# Patient Record
Sex: MALE | Race: WHITE | Employment: OTHER | URBAN - METROPOLITAN AREA
[De-identification: names, ages, dates, MRNs, and addresses within clinical notes are randomized per-mention and may not be internally consistent; named-entity substitution may affect disease eponyms.]

---

## 2019-01-01 ENCOUNTER — APPOINTMENT (OUTPATIENT)
Dept: CARDIOLOGY | Facility: CLINIC | Age: 74
DRG: 177 | End: 2019-01-01
Attending: INTERNAL MEDICINE
Payer: COMMERCIAL

## 2019-01-01 ENCOUNTER — APPOINTMENT (OUTPATIENT)
Dept: ULTRASOUND IMAGING | Facility: CLINIC | Age: 74
End: 2019-01-01
Attending: FAMILY MEDICINE
Payer: COMMERCIAL

## 2019-01-01 ENCOUNTER — HOSPITAL LABORATORY (OUTPATIENT)
Facility: OTHER | Age: 74
End: 2019-01-01

## 2019-01-01 ENCOUNTER — NURSING HOME VISIT (OUTPATIENT)
Dept: GERIATRICS | Facility: CLINIC | Age: 74
End: 2019-01-01
Payer: COMMERCIAL

## 2019-01-01 ENCOUNTER — HOSPITAL ENCOUNTER (EMERGENCY)
Facility: CLINIC | Age: 74
Discharge: HOME | End: 2019-07-24
Attending: EMERGENCY MEDICINE | Admitting: EMERGENCY MEDICINE
Payer: COMMERCIAL

## 2019-01-01 ENCOUNTER — APPOINTMENT (OUTPATIENT)
Dept: GENERAL RADIOLOGY | Facility: CLINIC | Age: 74
DRG: 177 | End: 2019-01-01
Attending: STUDENT IN AN ORGANIZED HEALTH CARE EDUCATION/TRAINING PROGRAM
Payer: COMMERCIAL

## 2019-01-01 ENCOUNTER — APPOINTMENT (OUTPATIENT)
Dept: CT IMAGING | Facility: CLINIC | Age: 74
DRG: 177 | End: 2019-01-01
Attending: STUDENT IN AN ORGANIZED HEALTH CARE EDUCATION/TRAINING PROGRAM
Payer: COMMERCIAL

## 2019-01-01 ENCOUNTER — TELEPHONE (OUTPATIENT)
Dept: GERIATRICS | Facility: CLINIC | Age: 74
End: 2019-01-01

## 2019-01-01 ENCOUNTER — APPOINTMENT (OUTPATIENT)
Dept: GENERAL RADIOLOGY | Facility: CLINIC | Age: 74
DRG: 177 | End: 2019-01-01
Attending: INTERNAL MEDICINE
Payer: COMMERCIAL

## 2019-01-01 ENCOUNTER — NURSING HOME VISIT (OUTPATIENT)
Dept: GERIATRICS | Facility: CLINIC | Age: 74
End: 2019-01-01
Payer: MEDICARE

## 2019-01-01 ENCOUNTER — AMBULATORY - HEALTHEAST (OUTPATIENT)
Dept: OTHER | Facility: CLINIC | Age: 74
End: 2019-01-01

## 2019-01-01 ENCOUNTER — DOCUMENTATION ONLY (OUTPATIENT)
Dept: OTHER | Facility: CLINIC | Age: 74
End: 2019-01-01

## 2019-01-01 ENCOUNTER — HOSPITAL ENCOUNTER (EMERGENCY)
Facility: CLINIC | Age: 74
Discharge: HOME | End: 2019-07-20
Attending: EMERGENCY MEDICINE | Admitting: EMERGENCY MEDICINE
Payer: COMMERCIAL

## 2019-01-01 ENCOUNTER — HOSPITAL ENCOUNTER (EMERGENCY)
Facility: CLINIC | Age: 74
Discharge: HOME | End: 2019-07-04
Attending: FAMILY MEDICINE | Admitting: FAMILY MEDICINE
Payer: COMMERCIAL

## 2019-01-01 ENCOUNTER — APPOINTMENT (OUTPATIENT)
Dept: CT IMAGING | Facility: CLINIC | Age: 74
DRG: 177 | End: 2019-01-01
Attending: PHYSICIAN ASSISTANT
Payer: COMMERCIAL

## 2019-01-01 ENCOUNTER — APPOINTMENT (OUTPATIENT)
Dept: CT IMAGING | Facility: CLINIC | Age: 74
End: 2019-01-01
Attending: FAMILY MEDICINE
Payer: COMMERCIAL

## 2019-01-01 ENCOUNTER — HOSPITAL ENCOUNTER (INPATIENT)
Facility: CLINIC | Age: 74
LOS: 4 days | Discharge: NURSING, REHAB OR LONG-TERM CARE | DRG: 177 | End: 2019-07-19
Attending: STUDENT IN AN ORGANIZED HEALTH CARE EDUCATION/TRAINING PROGRAM | Admitting: INTERNAL MEDICINE
Payer: COMMERCIAL

## 2019-01-01 ENCOUNTER — HOSPITAL ENCOUNTER (EMERGENCY)
Facility: CLINIC | Age: 74
Discharge: HOME | End: 2019-07-11
Attending: FAMILY MEDICINE | Admitting: FAMILY MEDICINE
Payer: COMMERCIAL

## 2019-01-01 ENCOUNTER — APPOINTMENT (OUTPATIENT)
Dept: GENERAL RADIOLOGY | Facility: CLINIC | Age: 74
End: 2019-01-01
Attending: EMERGENCY MEDICINE
Payer: COMMERCIAL

## 2019-01-01 ENCOUNTER — MEDICAL CORRESPONDENCE (OUTPATIENT)
Dept: HEALTH INFORMATION MANAGEMENT | Facility: CLINIC | Age: 74
End: 2019-01-01

## 2019-01-01 VITALS
BODY MASS INDEX: 25.29 KG/M2 | SYSTOLIC BLOOD PRESSURE: 122 MMHG | WEIGHT: 186.7 LBS | OXYGEN SATURATION: 95 % | DIASTOLIC BLOOD PRESSURE: 80 MMHG | HEART RATE: 75 BPM | HEIGHT: 72 IN | RESPIRATION RATE: 18 BRPM | TEMPERATURE: 97.8 F

## 2019-01-01 VITALS
SYSTOLIC BLOOD PRESSURE: 111 MMHG | HEIGHT: 72 IN | WEIGHT: 184.4 LBS | DIASTOLIC BLOOD PRESSURE: 73 MMHG | TEMPERATURE: 99 F | RESPIRATION RATE: 18 BRPM | OXYGEN SATURATION: 93 % | BODY MASS INDEX: 24.98 KG/M2 | HEART RATE: 87 BPM

## 2019-01-01 VITALS
HEART RATE: 89 BPM | SYSTOLIC BLOOD PRESSURE: 134 MMHG | TEMPERATURE: 99.8 F | WEIGHT: 186 LBS | RESPIRATION RATE: 24 BRPM | OXYGEN SATURATION: 95 % | BODY MASS INDEX: 25.19 KG/M2 | HEIGHT: 72 IN | DIASTOLIC BLOOD PRESSURE: 77 MMHG

## 2019-01-01 VITALS
DIASTOLIC BLOOD PRESSURE: 96 MMHG | SYSTOLIC BLOOD PRESSURE: 153 MMHG | RESPIRATION RATE: 18 BRPM | HEART RATE: 82 BPM | OXYGEN SATURATION: 96 % | TEMPERATURE: 97.6 F

## 2019-01-01 VITALS
WEIGHT: 180 LBS | RESPIRATION RATE: 18 BRPM | DIASTOLIC BLOOD PRESSURE: 82 MMHG | BODY MASS INDEX: 24.41 KG/M2 | SYSTOLIC BLOOD PRESSURE: 120 MMHG | TEMPERATURE: 97.9 F | OXYGEN SATURATION: 97 %

## 2019-01-01 VITALS
BODY MASS INDEX: 24.41 KG/M2 | WEIGHT: 180 LBS | DIASTOLIC BLOOD PRESSURE: 77 MMHG | SYSTOLIC BLOOD PRESSURE: 123 MMHG | HEART RATE: 92 BPM | RESPIRATION RATE: 16 BRPM | OXYGEN SATURATION: 96 % | TEMPERATURE: 98.4 F

## 2019-01-01 VITALS
WEIGHT: 181.44 LBS | BODY MASS INDEX: 24.58 KG/M2 | SYSTOLIC BLOOD PRESSURE: 153 MMHG | DIASTOLIC BLOOD PRESSURE: 78 MMHG | RESPIRATION RATE: 18 BRPM | TEMPERATURE: 97.7 F | HEART RATE: 69 BPM | OXYGEN SATURATION: 96 % | HEIGHT: 72 IN

## 2019-01-01 VITALS
TEMPERATURE: 98.2 F | RESPIRATION RATE: 18 BRPM | OXYGEN SATURATION: 95 % | DIASTOLIC BLOOD PRESSURE: 81 MMHG | BODY MASS INDEX: 26.66 KG/M2 | HEART RATE: 97 BPM | SYSTOLIC BLOOD PRESSURE: 136 MMHG | WEIGHT: 184.5 LBS

## 2019-01-01 VITALS
HEART RATE: 82 BPM | SYSTOLIC BLOOD PRESSURE: 121 MMHG | RESPIRATION RATE: 14 BRPM | DIASTOLIC BLOOD PRESSURE: 76 MMHG | OXYGEN SATURATION: 96 % | TEMPERATURE: 97.7 F

## 2019-01-01 VITALS
WEIGHT: 185 LBS | RESPIRATION RATE: 18 BRPM | DIASTOLIC BLOOD PRESSURE: 83 MMHG | OXYGEN SATURATION: 95 % | HEIGHT: 72 IN | SYSTOLIC BLOOD PRESSURE: 133 MMHG | TEMPERATURE: 98.5 F | BODY MASS INDEX: 25.06 KG/M2 | HEART RATE: 94 BPM

## 2019-01-01 DIAGNOSIS — R53.1 WEAKNESS DUE TO ACUTE CEREBROVASCULAR ACCIDENT (CVA) (H): ICD-10-CM
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
## 2019-01-01 DIAGNOSIS — T85.698A OBSTRUCTION OF CATHETER, INITIAL ENCOUNTER: ICD-10-CM

## 2019-01-01 DIAGNOSIS — I63.9 WEAKNESS DUE TO ACUTE CEREBROVASCULAR ACCIDENT (CVA) (H): ICD-10-CM

## 2019-01-01 DIAGNOSIS — J18.9 PNEUMONIA OF RIGHT LUNG DUE TO INFECTIOUS ORGANISM, UNSPECIFIED PART OF LUNG: ICD-10-CM

## 2019-01-01 DIAGNOSIS — R79.89 LFT ELEVATION: ICD-10-CM

## 2019-01-01 DIAGNOSIS — J18.9 PNEUMONIA OF BOTH LOWER LOBES DUE TO INFECTIOUS ORGANISM: ICD-10-CM

## 2019-01-01 DIAGNOSIS — I25.10 CAD, MULTIPLE VESSEL: ICD-10-CM

## 2019-01-01 DIAGNOSIS — Z93.1 FEEDING BY G-TUBE (H): ICD-10-CM

## 2019-01-01 DIAGNOSIS — I25.5 ISCHEMIC CARDIOMYOPATHY: ICD-10-CM

## 2019-01-01 DIAGNOSIS — J96.01 ACUTE RESPIRATORY FAILURE WITH HYPOXIA (H): ICD-10-CM

## 2019-01-01 DIAGNOSIS — K52.9 COLITIS: Primary | ICD-10-CM

## 2019-01-01 DIAGNOSIS — E43 SEVERE PROTEIN-CALORIE MALNUTRITION (H): ICD-10-CM

## 2019-01-01 DIAGNOSIS — Z99.81 DEPENDENCE ON SUPPLEMENTAL OXYGEN: ICD-10-CM

## 2019-01-01 DIAGNOSIS — I69.90 HISTORY OF CEREBROVASCULAR ACCIDENT WITH RESIDUAL EFFECTS: Primary | ICD-10-CM

## 2019-01-01 DIAGNOSIS — N18.2 CKD (CHRONIC KIDNEY DISEASE) STAGE 2, GFR 60-89 ML/MIN: ICD-10-CM

## 2019-01-01 DIAGNOSIS — N47.1 PHIMOSIS: ICD-10-CM

## 2019-01-01 DIAGNOSIS — R13.12 OROPHARYNGEAL DYSPHAGIA: ICD-10-CM

## 2019-01-01 DIAGNOSIS — N13.8 HYPERTROPHY OF PROSTATE WITH URINARY OBSTRUCTION: ICD-10-CM

## 2019-01-01 DIAGNOSIS — R47.01 NONVERBAL: ICD-10-CM

## 2019-01-01 DIAGNOSIS — Z43.1 ATTENTION TO G-TUBE (H): ICD-10-CM

## 2019-01-01 DIAGNOSIS — I50.42 CHRONIC COMBINED SYSTOLIC AND DIASTOLIC CONGESTIVE HEART FAILURE (H): ICD-10-CM

## 2019-01-01 DIAGNOSIS — F32.2 MAJOR DEPRESSIVE DISORDER, SINGLE EPISODE, SEVERE (H): ICD-10-CM

## 2019-01-01 DIAGNOSIS — N39.0 URINARY TRACT INFECTION WITHOUT HEMATURIA, SITE UNSPECIFIED: ICD-10-CM

## 2019-01-01 DIAGNOSIS — K52.9 COLITIS: ICD-10-CM

## 2019-01-01 DIAGNOSIS — R79.89 ELEVATED TROPONIN: ICD-10-CM

## 2019-01-01 DIAGNOSIS — Z51.5 HOSPICE CARE PATIENT: ICD-10-CM

## 2019-01-01 DIAGNOSIS — I21.4 NSTEMI (NON-ST ELEVATED MYOCARDIAL INFARCTION) (H): Primary | ICD-10-CM

## 2019-01-01 DIAGNOSIS — R79.89 ELEVATED BRAIN NATRIURETIC PEPTIDE (BNP) LEVEL: ICD-10-CM

## 2019-01-01 DIAGNOSIS — R33.9 URINARY RETENTION: Primary | ICD-10-CM

## 2019-01-01 DIAGNOSIS — R52 PAIN: Primary | ICD-10-CM

## 2019-01-01 DIAGNOSIS — N40.1 HYPERTROPHY OF PROSTATE WITH URINARY OBSTRUCTION: ICD-10-CM

## 2019-01-01 DIAGNOSIS — R09.02 HYPOXIA: ICD-10-CM

## 2019-01-01 DIAGNOSIS — Z93.1 GASTROSTOMY TUBE DEPENDENT (H): ICD-10-CM

## 2019-01-01 DIAGNOSIS — K70.30 ALCOHOLIC CIRRHOSIS OF LIVER WITHOUT ASCITES (H): ICD-10-CM

## 2019-01-01 DIAGNOSIS — R33.9 URINARY RETENTION: ICD-10-CM

## 2019-01-01 DIAGNOSIS — R33.9 URINARY RETENTION WITH INCOMPLETE BLADDER EMPTYING: ICD-10-CM

## 2019-01-01 DIAGNOSIS — K56.0 PARALYTIC ILEUS (H): ICD-10-CM

## 2019-01-01 DIAGNOSIS — G93.40 ACUTE ENCEPHALOPATHY: ICD-10-CM

## 2019-01-01 DIAGNOSIS — E78.5 HYPERLIPIDEMIA WITH TARGET LDL LESS THAN 130: ICD-10-CM

## 2019-01-01 DIAGNOSIS — Z43.0 TRACHEOSTOMY CARE (H): ICD-10-CM

## 2019-01-01 DIAGNOSIS — K94.23 GASTROSTOMY TUBE OBSTRUCTION (H): ICD-10-CM

## 2019-01-01 DIAGNOSIS — H04.123 DRY EYES: ICD-10-CM

## 2019-01-01 DIAGNOSIS — R41.0 CONFUSION: ICD-10-CM

## 2019-01-01 DIAGNOSIS — K70.30 ALCOHOLIC CIRRHOSIS, UNSPECIFIED WHETHER ASCITES PRESENT (H): ICD-10-CM

## 2019-01-01 DIAGNOSIS — I69.90 HISTORY OF CEREBROVASCULAR ACCIDENT WITH RESIDUAL EFFECTS: ICD-10-CM

## 2019-01-01 DIAGNOSIS — R53.81 PHYSICAL DECONDITIONING: ICD-10-CM

## 2019-01-01 DIAGNOSIS — T85.598A OBSTRUCTION OF FEEDING TUBE, INITIAL ENCOUNTER: ICD-10-CM

## 2019-01-01 DIAGNOSIS — G47.00 INSOMNIA, UNSPECIFIED TYPE: ICD-10-CM

## 2019-01-01 DIAGNOSIS — F17.210 CIGARETTE SMOKER: ICD-10-CM

## 2019-01-01 LAB
ABO + RH BLD: NORMAL
ABO + RH BLD: NORMAL
ALBUMIN SERPL-MCNC: 2 G/DL (ref 3.4–5)
ALBUMIN SERPL-MCNC: 2 G/DL (ref 3.4–5)
ALBUMIN SERPL-MCNC: 2.3 G/DL (ref 3.4–5)
ALBUMIN SERPL-MCNC: 2.9 G/DL (ref 3.4–5)
ALBUMIN UR-MCNC: 100 MG/DL
ALBUMIN UR-MCNC: NEGATIVE MG/DL
ALBUMIN UR-MCNC: NEGATIVE MG/DL
ALP SERPL-CCNC: 236 U/L (ref 40–150)
ALP SERPL-CCNC: 241 U/L (ref 40–150)
ALP SERPL-CCNC: 326 U/L (ref 40–150)
ALP SERPL-CCNC: 417 U/L (ref 40–150)
ALT SERPL W P-5'-P-CCNC: 47 U/L (ref 0–70)
ALT SERPL W P-5'-P-CCNC: 58 U/L (ref 0–70)
ALT SERPL W P-5'-P-CCNC: 79 U/L (ref 0–70)
ALT SERPL W P-5'-P-CCNC: 95 U/L (ref 0–70)
AMMONIA PLAS-SCNC: 28 UMOL/L (ref 10–50)
AMORPH CRY #/AREA URNS HPF: ABNORMAL /HPF
ANION GAP SERPL CALCULATED.3IONS-SCNC: 6 MMOL/L (ref 3–14)
ANION GAP SERPL CALCULATED.3IONS-SCNC: 6 MMOL/L (ref 3–14)
ANION GAP SERPL CALCULATED.3IONS-SCNC: 7 MMOL/L (ref 3–14)
ANION GAP SERPL CALCULATED.3IONS-SCNC: 8 MMOL/L (ref 3–14)
ANION GAP SERPL CALCULATED.3IONS-SCNC: 8 MMOL/L (ref 3–14)
ANION GAP SERPL CALCULATED.3IONS-SCNC: 9 MMOL/L (ref 3–14)
ANION GAP SERPL CALCULATED.3IONS-SCNC: 9 MMOL/L (ref 3–14)
APPEARANCE UR: ABNORMAL
APPEARANCE UR: ABNORMAL
APPEARANCE UR: CLEAR
APTT PPP: 64 SEC (ref 22–37)
AST SERPL W P-5'-P-CCNC: 42 U/L (ref 0–45)
AST SERPL W P-5'-P-CCNC: 44 U/L (ref 0–45)
AST SERPL W P-5'-P-CCNC: 57 U/L (ref 0–45)
AST SERPL W P-5'-P-CCNC: 68 U/L (ref 0–45)
BACTERIA #/AREA URNS HPF: ABNORMAL /HPF
BACTERIA #/AREA URNS HPF: ABNORMAL /HPF
BACTERIA SPEC CULT: ABNORMAL
BACTERIA SPEC CULT: NO GROWTH
BASE EXCESS BLDV CALC-SCNC: 5 MMOL/L
BASOPHILS # BLD AUTO: 0.1 10E9/L (ref 0–0.2)
BASOPHILS NFR BLD AUTO: 0.3 %
BASOPHILS NFR BLD AUTO: 0.4 %
BASOPHILS NFR BLD AUTO: 0.5 %
BASOPHILS NFR BLD AUTO: 0.5 %
BILIRUB DIRECT SERPL-MCNC: 0.3 MG/DL (ref 0–0.2)
BILIRUB SERPL-MCNC: 0.5 MG/DL (ref 0.2–1.3)
BILIRUB SERPL-MCNC: 0.5 MG/DL (ref 0.2–1.3)
BILIRUB SERPL-MCNC: 0.6 MG/DL (ref 0.2–1.3)
BILIRUB SERPL-MCNC: 1.1 MG/DL (ref 0.2–1.3)
BILIRUB UR QL STRIP: NEGATIVE
BLD GP AB SCN SERPL QL: NORMAL
BLD PROD TYP BPU: NORMAL
BLD UNIT ID BPU: 0
BLD UNIT ID BPU: 0
BLOOD BANK CMNT PATIENT-IMP: NORMAL
BLOOD PRODUCT CODE: NORMAL
BLOOD PRODUCT CODE: NORMAL
BPU ID: NORMAL
BPU ID: NORMAL
BUN SERPL-MCNC: 22 MG/DL (ref 7–30)
BUN SERPL-MCNC: 26 MG/DL (ref 7–30)
BUN SERPL-MCNC: 29 MG/DL (ref 7–30)
BUN SERPL-MCNC: 31 MG/DL (ref 7–30)
BUN SERPL-MCNC: 32 MG/DL (ref 7–30)
BUN SERPL-MCNC: 35 MG/DL (ref 7–30)
BUN SERPL-MCNC: 36 MG/DL (ref 7–30)
BUN SERPL-MCNC: 39 MG/DL (ref 7–30)
BUN SERPL-MCNC: 43 MG/DL (ref 7–30)
BUN SERPL-MCNC: 46 MG/DL (ref 7–30)
C COLI+JEJUNI+LARI FUSA STL QL NAA+PROBE: NOT DETECTED
C DIFF TOX B STL QL: NEGATIVE
C DIFF TOX B STL QL: NEGATIVE
CALCIUM SERPL-MCNC: 7.9 MG/DL (ref 8.5–10.1)
CALCIUM SERPL-MCNC: 8.4 MG/DL (ref 8.5–10.1)
CALCIUM SERPL-MCNC: 8.6 MG/DL (ref 8.5–10.1)
CALCIUM SERPL-MCNC: 8.6 MG/DL (ref 8.5–10.1)
CALCIUM SERPL-MCNC: 8.7 MG/DL (ref 8.5–10.1)
CALCIUM SERPL-MCNC: 9 MG/DL (ref 8.5–10.1)
CALCIUM SERPL-MCNC: 9.1 MG/DL (ref 8.5–10.1)
CALCIUM SERPL-MCNC: 9.2 MG/DL (ref 8.5–10.1)
CALCIUM SERPL-MCNC: 9.3 MG/DL (ref 8.5–10.1)
CALCIUM SERPL-MCNC: 9.4 MG/DL (ref 8.5–10.1)
CHLORIDE SERPL-SCNC: 102 MMOL/L (ref 94–109)
CHLORIDE SERPL-SCNC: 103 MMOL/L (ref 94–109)
CHLORIDE SERPL-SCNC: 103 MMOL/L (ref 94–109)
CHLORIDE SERPL-SCNC: 104 MMOL/L (ref 94–109)
CHLORIDE SERPL-SCNC: 104 MMOL/L (ref 94–109)
CHLORIDE SERPL-SCNC: 109 MMOL/L (ref 94–109)
CHLORIDE SERPL-SCNC: 109 MMOL/L (ref 94–109)
CHLORIDE SERPL-SCNC: 114 MMOL/L (ref 94–109)
CHLORIDE SERPL-SCNC: 115 MMOL/L (ref 94–109)
CHLORIDE SERPL-SCNC: 116 MMOL/L (ref 94–109)
CO2 SERPL-SCNC: 21 MMOL/L (ref 20–32)
CO2 SERPL-SCNC: 21 MMOL/L (ref 20–32)
CO2 SERPL-SCNC: 23 MMOL/L (ref 20–32)
CO2 SERPL-SCNC: 23 MMOL/L (ref 20–32)
CO2 SERPL-SCNC: 26 MMOL/L (ref 20–32)
CO2 SERPL-SCNC: 27 MMOL/L (ref 20–32)
CO2 SERPL-SCNC: 28 MMOL/L (ref 20–32)
CO2 SERPL-SCNC: 29 MMOL/L (ref 20–32)
COLLECT DATE STL: ABNORMAL
COLOR UR AUTO: YELLOW
COPATH REPORT: NORMAL
CREAT SERPL-MCNC: 0.97 MG/DL (ref 0.66–1.25)
CREAT SERPL-MCNC: 0.99 MG/DL (ref 0.66–1.25)
CREAT SERPL-MCNC: 1.04 MG/DL (ref 0.66–1.25)
CREAT SERPL-MCNC: 1.15 MG/DL (ref 0.66–1.25)
CREAT SERPL-MCNC: 1.16 MG/DL (ref 0.66–1.25)
CREAT SERPL-MCNC: 1.17 MG/DL (ref 0.66–1.25)
CREAT SERPL-MCNC: 1.21 MG/DL (ref 0.66–1.25)
CREAT SERPL-MCNC: 1.24 MG/DL (ref 0.66–1.25)
CREAT SERPL-MCNC: 1.24 MG/DL (ref 0.66–1.25)
CREAT SERPL-MCNC: 1.32 MG/DL (ref 0.66–1.25)
DIFFERENTIAL METHOD BLD: ABNORMAL
EC STX1 GENE STL QL NAA+PROBE: NOT DETECTED
EC STX2 GENE STL QL NAA+PROBE: NOT DETECTED
ENTERIC PATHOGEN COMMENT: NORMAL
EOSINOPHIL # BLD AUTO: 0.2 10E9/L (ref 0–0.7)
EOSINOPHIL # BLD AUTO: 0.6 10E9/L (ref 0–0.7)
EOSINOPHIL # BLD AUTO: 0.6 10E9/L (ref 0–0.7)
EOSINOPHIL # BLD AUTO: 0.7 10E9/L (ref 0–0.7)
EOSINOPHIL NFR BLD AUTO: 1.2 %
EOSINOPHIL NFR BLD AUTO: 4.6 %
EOSINOPHIL NFR BLD AUTO: 5.4 %
EOSINOPHIL NFR BLD AUTO: 5.6 %
ERYTHROCYTE [DISTWIDTH] IN BLOOD BY AUTOMATED COUNT: 16.4 % (ref 10–15)
ERYTHROCYTE [DISTWIDTH] IN BLOOD BY AUTOMATED COUNT: 17 % (ref 10–15)
ERYTHROCYTE [DISTWIDTH] IN BLOOD BY AUTOMATED COUNT: 17 % (ref 10–15)
ERYTHROCYTE [DISTWIDTH] IN BLOOD BY AUTOMATED COUNT: 17.1 % (ref 10–15)
ERYTHROCYTE [DISTWIDTH] IN BLOOD BY AUTOMATED COUNT: 17.9 % (ref 10–15)
FOLATE SERPL-MCNC: 32.9 NG/ML
GFR SERPL CREATININE-BSD FRML MDRD: 52 ML/MIN/{1.73_M2}
GFR SERPL CREATININE-BSD FRML MDRD: 56 ML/MIN/{1.73_M2}
GFR SERPL CREATININE-BSD FRML MDRD: 56 ML/MIN/{1.73_M2}
GFR SERPL CREATININE-BSD FRML MDRD: 58 ML/MIN/{1.73_M2}
GFR SERPL CREATININE-BSD FRML MDRD: 60 ML/MIN/{1.73_M2}
GFR SERPL CREATININE-BSD FRML MDRD: 61 ML/MIN/{1.73_M2}
GFR SERPL CREATININE-BSD FRML MDRD: 62 ML/MIN/{1.73_M2}
GFR SERPL CREATININE-BSD FRML MDRD: 70 ML/MIN/{1.73_M2}
GFR SERPL CREATININE-BSD FRML MDRD: 74 ML/MIN/{1.73_M2}
GFR SERPL CREATININE-BSD FRML MDRD: 76 ML/MIN/{1.73_M2}
GLUCOSE SERPL-MCNC: 112 MG/DL (ref 70–99)
GLUCOSE SERPL-MCNC: 124 MG/DL (ref 70–99)
GLUCOSE SERPL-MCNC: 136 MG/DL (ref 70–99)
GLUCOSE SERPL-MCNC: 138 MG/DL (ref 70–99)
GLUCOSE SERPL-MCNC: 141 MG/DL (ref 70–99)
GLUCOSE SERPL-MCNC: 142 MG/DL (ref 70–99)
GLUCOSE SERPL-MCNC: 78 MG/DL (ref 70–99)
GLUCOSE SERPL-MCNC: 82 MG/DL (ref 70–99)
GLUCOSE SERPL-MCNC: 88 MG/DL (ref 70–99)
GLUCOSE SERPL-MCNC: 90 MG/DL (ref 70–99)
GLUCOSE UR STRIP-MCNC: NEGATIVE MG/DL
HAV IGM SERPL QL IA: NONREACTIVE
HBV SURFACE AG SERPL QL IA: NONREACTIVE
HCO3 BLDV-SCNC: 29 MMOL/L (ref 21–28)
HCT VFR BLD AUTO: 29.5 % (ref 40–53)
HCT VFR BLD AUTO: 31.2 % (ref 40–53)
HCT VFR BLD AUTO: 32.2 % (ref 40–53)
HCT VFR BLD AUTO: 36.5 % (ref 40–53)
HCT VFR BLD AUTO: 38.8 % (ref 40–53)
HCV AB SERPL QL IA: NONREACTIVE
HEMOCCULT SP1 STL QL: POSITIVE
HGB BLD-MCNC: 11.2 G/DL (ref 13.3–17.7)
HGB BLD-MCNC: 11.4 G/DL (ref 13.3–17.7)
HGB BLD-MCNC: 11.5 G/DL (ref 13.3–17.7)
HGB BLD-MCNC: 11.8 G/DL (ref 13.3–17.7)
HGB BLD-MCNC: 8.7 G/DL (ref 13.3–17.7)
HGB BLD-MCNC: 9.3 G/DL (ref 13.3–17.7)
HGB BLD-MCNC: 9.9 G/DL (ref 13.3–17.7)
HGB UR QL STRIP: NEGATIVE
HYALINE CASTS #/AREA URNS LPF: 3 /LPF (ref 0–2)
IMM GRANULOCYTES # BLD: 0.1 10E9/L (ref 0–0.4)
IMM GRANULOCYTES # BLD: 0.2 10E9/L (ref 0–0.4)
IMM GRANULOCYTES NFR BLD: 0.6 %
IMM GRANULOCYTES NFR BLD: 0.8 %
IMM GRANULOCYTES NFR BLD: 0.8 %
IMM GRANULOCYTES NFR BLD: 0.9 %
INR PPP: 1.44 (ref 0.86–1.14)
IRON SATN MFR SERPL: 8 % (ref 15–46)
IRON SERPL-MCNC: 16 UG/DL (ref 35–180)
KETONES UR STRIP-MCNC: NEGATIVE MG/DL
LACTATE BLD-SCNC: 1.1 MMOL/L (ref 0.7–2)
LACTATE BLD-SCNC: 1.4 MMOL/L (ref 0.7–2)
LACTATE BLD-SCNC: 1.6 MMOL/L (ref 0.7–2)
LACTATE BLD-SCNC: 2.2 MMOL/L (ref 0.7–2)
LEUKOCYTE ESTERASE UR QL STRIP: ABNORMAL
LIPASE SERPL-CCNC: 156 U/L (ref 73–393)
LIPASE SERPL-CCNC: 66 U/L (ref 73–393)
LYMPHOCYTES # BLD AUTO: 1.1 10E9/L (ref 0.8–5.3)
LYMPHOCYTES # BLD AUTO: 1.3 10E9/L (ref 0.8–5.3)
LYMPHOCYTES # BLD AUTO: 1.3 10E9/L (ref 0.8–5.3)
LYMPHOCYTES # BLD AUTO: 1.7 10E9/L (ref 0.8–5.3)
LYMPHOCYTES NFR BLD AUTO: 10.7 %
LYMPHOCYTES NFR BLD AUTO: 11.4 %
LYMPHOCYTES NFR BLD AUTO: 8.3 %
LYMPHOCYTES NFR BLD AUTO: 8.4 %
Lab: ABNORMAL
Lab: NORMAL
MAGNESIUM SERPL-MCNC: 2.2 MG/DL (ref 1.6–2.3)
MAGNESIUM SERPL-MCNC: 2.3 MG/DL (ref 1.6–2.3)
MAGNESIUM SERPL-MCNC: 2.4 MG/DL (ref 1.6–2.3)
MCH RBC QN AUTO: 26.1 PG (ref 26.5–33)
MCH RBC QN AUTO: 26.9 PG (ref 26.5–33)
MCH RBC QN AUTO: 26.9 PG (ref 26.5–33)
MCH RBC QN AUTO: 27.1 PG (ref 26.5–33)
MCH RBC QN AUTO: 27.2 PG (ref 26.5–33)
MCHC RBC AUTO-ENTMCNC: 29.5 G/DL (ref 31.5–36.5)
MCHC RBC AUTO-ENTMCNC: 29.8 G/DL (ref 31.5–36.5)
MCHC RBC AUTO-ENTMCNC: 30.4 G/DL (ref 31.5–36.5)
MCHC RBC AUTO-ENTMCNC: 30.7 G/DL (ref 31.5–36.5)
MCHC RBC AUTO-ENTMCNC: 30.7 G/DL (ref 31.5–36.5)
MCV RBC AUTO: 88 FL (ref 78–100)
MCV RBC AUTO: 89 FL (ref 78–100)
MCV RBC AUTO: 90 FL (ref 78–100)
MONOCYTES # BLD AUTO: 0.9 10E9/L (ref 0–1.3)
MONOCYTES # BLD AUTO: 1.1 10E9/L (ref 0–1.3)
MONOCYTES # BLD AUTO: 1.6 10E9/L (ref 0–1.3)
MONOCYTES # BLD AUTO: 2.2 10E9/L (ref 0–1.3)
MONOCYTES NFR BLD AUTO: 10.1 %
MONOCYTES NFR BLD AUTO: 10.7 %
MONOCYTES NFR BLD AUTO: 11.2 %
MONOCYTES NFR BLD AUTO: 8.2 %
MUCOUS THREADS #/AREA URNS LPF: PRESENT /LPF
MUCOUS THREADS #/AREA URNS LPF: PRESENT /LPF
NEUTROPHILS # BLD AUTO: 12.2 10E9/L (ref 1.6–8.3)
NEUTROPHILS # BLD AUTO: 15.6 10E9/L (ref 1.6–8.3)
NEUTROPHILS # BLD AUTO: 7.1 10E9/L (ref 1.6–8.3)
NEUTROPHILS # BLD AUTO: 8.1 10E9/L (ref 1.6–8.3)
NEUTROPHILS NFR BLD AUTO: 71.7 %
NEUTROPHILS NFR BLD AUTO: 73.6 %
NEUTROPHILS NFR BLD AUTO: 76 %
NEUTROPHILS NFR BLD AUTO: 78.1 %
NITRATE UR QL: NEGATIVE
NOROV GI+II ORF1-ORF2 JNC STL QL NAA+PR: NOT DETECTED
NRBC # BLD AUTO: 0 10*3/UL
NRBC BLD AUTO-RTO: 0 /100
NT-PROBNP SERPL-MCNC: 2860 PG/ML (ref 0–1800)
NUM BPU REQUESTED: 2
O2/TOTAL GAS SETTING VFR VENT: ABNORMAL %
PCO2 BLDV: 41 MM HG (ref 40–50)
PH BLDV: 7.47 PH (ref 7.32–7.43)
PH UR STRIP: 5 PH (ref 5–7)
PH UR STRIP: 7 PH (ref 5–7)
PH UR STRIP: 9 PH (ref 5–7)
PLATELET # BLD AUTO: 237 10E9/L (ref 150–450)
PLATELET # BLD AUTO: 238 10E9/L (ref 150–450)
PLATELET # BLD AUTO: 261 10E9/L (ref 150–450)
PLATELET # BLD AUTO: 265 10E9/L (ref 150–450)
PLATELET # BLD AUTO: 277 10E9/L (ref 150–450)
PO2 BLDV: 36 MM HG (ref 25–47)
POTASSIUM SERPL-SCNC: 3.2 MMOL/L (ref 3.4–5.3)
POTASSIUM SERPL-SCNC: 3.3 MMOL/L (ref 3.4–5.3)
POTASSIUM SERPL-SCNC: 3.5 MMOL/L (ref 3.4–5.3)
POTASSIUM SERPL-SCNC: 3.6 MMOL/L (ref 3.4–5.3)
POTASSIUM SERPL-SCNC: 3.7 MMOL/L (ref 3.4–5.3)
POTASSIUM SERPL-SCNC: 3.7 MMOL/L (ref 3.4–5.3)
POTASSIUM SERPL-SCNC: 3.8 MMOL/L (ref 3.4–5.3)
POTASSIUM SERPL-SCNC: 3.8 MMOL/L (ref 3.4–5.3)
POTASSIUM SERPL-SCNC: 4 MMOL/L (ref 3.4–5.3)
POTASSIUM SERPL-SCNC: 4.5 MMOL/L (ref 3.4–5.3)
PROCALCITONIN SERPL-MCNC: 0.64 NG/ML
PROT SERPL-MCNC: 6.2 G/DL (ref 6.8–8.8)
PROT SERPL-MCNC: 6.3 G/DL (ref 6.8–8.8)
PROT SERPL-MCNC: 7.2 G/DL (ref 6.8–8.8)
PROT SERPL-MCNC: 7.7 G/DL (ref 6.8–8.8)
RBC # BLD AUTO: 3.33 10E12/L (ref 4.4–5.9)
RBC # BLD AUTO: 3.46 10E12/L (ref 4.4–5.9)
RBC # BLD AUTO: 3.64 10E12/L (ref 4.4–5.9)
RBC # BLD AUTO: 4.13 10E12/L (ref 4.4–5.9)
RBC # BLD AUTO: 4.38 10E12/L (ref 4.4–5.9)
RBC #/AREA URNS AUTO: 1 /HPF (ref 0–2)
RBC #/AREA URNS AUTO: 19 /HPF (ref 0–2)
RBC #/AREA URNS AUTO: 6 /HPF (ref 0–2)
RETICS # AUTO: 91.7 10E9/L (ref 25–95)
RETICS/RBC NFR AUTO: 2.7 % (ref 0.5–2)
RVA NSP5 STL QL NAA+PROBE: NOT DETECTED
S PNEUM AG SPEC QL: NORMAL
SALMONELLA SP RPOD STL QL NAA+PROBE: NOT DETECTED
SHIGELLA SP+EIEC IPAH STL QL NAA+PROBE: NOT DETECTED
SODIUM SERPL-SCNC: 136 MMOL/L (ref 133–144)
SODIUM SERPL-SCNC: 137 MMOL/L (ref 133–144)
SODIUM SERPL-SCNC: 138 MMOL/L (ref 133–144)
SODIUM SERPL-SCNC: 138 MMOL/L (ref 133–144)
SODIUM SERPL-SCNC: 139 MMOL/L (ref 133–144)
SODIUM SERPL-SCNC: 139 MMOL/L (ref 133–144)
SODIUM SERPL-SCNC: 143 MMOL/L (ref 133–144)
SODIUM SERPL-SCNC: 144 MMOL/L (ref 133–144)
SODIUM SERPL-SCNC: 145 MMOL/L (ref 133–144)
SODIUM SERPL-SCNC: 146 MMOL/L (ref 133–144)
SOURCE: ABNORMAL
SP GR UR STRIP: 1.01 (ref 1–1.03)
SP GR UR STRIP: 1.01 (ref 1–1.03)
SP GR UR STRIP: 1.02 (ref 1–1.03)
SPECIMEN EXP DATE BLD: NORMAL
SPECIMEN SOURCE: ABNORMAL
SPECIMEN SOURCE: NORMAL
TIBC SERPL-MCNC: 212 UG/DL (ref 240–430)
TRANSFUSION STATUS PATIENT QL: NORMAL
TROPONIN I SERPL-MCNC: 0.05 UG/L (ref 0–0.04)
TROPONIN I SERPL-MCNC: 0.06 UG/L (ref 0–0.04)
TROPONIN I SERPL-MCNC: 0.07 UG/L (ref 0–0.04)
TSH SERPL DL<=0.005 MIU/L-ACNC: 0.89 MU/L (ref 0.4–4)
UROBILINOGEN UR STRIP-MCNC: 0 MG/DL (ref 0–2)
V CHOL+PARA RFBL+TRKH+TNAA STL QL NAA+PR: NOT DETECTED
VIT B12 SERPL-MCNC: 746 PG/ML (ref 193–986)
WBC # BLD AUTO: 10.9 10E9/L (ref 4–11)
WBC # BLD AUTO: 11.3 10E9/L (ref 4–11)
WBC # BLD AUTO: 16.1 10E9/L (ref 4–11)
WBC # BLD AUTO: 19.9 10E9/L (ref 4–11)
WBC # BLD AUTO: 9.9 10E9/L (ref 4–11)
WBC #/AREA URNS AUTO: 19 /HPF (ref 0–5)
WBC #/AREA URNS AUTO: 3 /HPF (ref 0–5)
WBC #/AREA URNS AUTO: 8 /HPF (ref 0–5)
Y ENTERO RECN STL QL NAA+PROBE: NOT DETECTED

## 2019-01-01 PROCEDURE — 25000125 ZZHC RX 250: Performed by: INTERNAL MEDICINE

## 2019-01-01 PROCEDURE — 25800030 ZZH RX IP 258 OP 636: Performed by: FAMILY MEDICINE

## 2019-01-01 PROCEDURE — 83605 ASSAY OF LACTIC ACID: CPT | Performed by: FAMILY MEDICINE

## 2019-01-01 PROCEDURE — 85025 COMPLETE CBC W/AUTO DIFF WBC: CPT | Performed by: FAMILY MEDICINE

## 2019-01-01 PROCEDURE — 25500064 ZZH RX 255 OP 636: Performed by: INTERNAL MEDICINE

## 2019-01-01 PROCEDURE — 25000128 H RX IP 250 OP 636: Performed by: PHYSICIAN ASSISTANT

## 2019-01-01 PROCEDURE — 99284 EMERGENCY DEPT VISIT MOD MDM: CPT | Mod: Z6 | Performed by: FAMILY MEDICINE

## 2019-01-01 PROCEDURE — 99284 EMERGENCY DEPT VISIT MOD MDM: CPT | Mod: 25 | Performed by: FAMILY MEDICINE

## 2019-01-01 PROCEDURE — 25800030 ZZH RX IP 258 OP 636: Performed by: INTERNAL MEDICINE

## 2019-01-01 PROCEDURE — 25000132 ZZH RX MED GY IP 250 OP 250 PS 637: Performed by: PHYSICIAN ASSISTANT

## 2019-01-01 PROCEDURE — 40000264 ECHOCARDIOGRAM COMPLETE

## 2019-01-01 PROCEDURE — 83735 ASSAY OF MAGNESIUM: CPT | Performed by: PHYSICIAN ASSISTANT

## 2019-01-01 PROCEDURE — 70450 CT HEAD/BRAIN W/O DYE: CPT

## 2019-01-01 PROCEDURE — 80053 COMPREHEN METABOLIC PANEL: CPT | Performed by: FAMILY MEDICINE

## 2019-01-01 PROCEDURE — 71046 X-RAY EXAM CHEST 2 VIEWS: CPT

## 2019-01-01 PROCEDURE — 25000132 ZZH RX MED GY IP 250 OP 250 PS 637: Performed by: INTERNAL MEDICINE

## 2019-01-01 PROCEDURE — 36415 COLL VENOUS BLD VENIPUNCTURE: CPT | Performed by: INTERNAL MEDICINE

## 2019-01-01 PROCEDURE — 80048 BASIC METABOLIC PNL TOTAL CA: CPT | Performed by: PHYSICIAN ASSISTANT

## 2019-01-01 PROCEDURE — 80053 COMPREHEN METABOLIC PANEL: CPT | Performed by: INTERNAL MEDICINE

## 2019-01-01 PROCEDURE — 99309 SBSQ NF CARE MODERATE MDM 30: CPT | Performed by: NURSE PRACTITIONER

## 2019-01-01 PROCEDURE — 76705 ECHO EXAM OF ABDOMEN: CPT

## 2019-01-01 PROCEDURE — 25000128 H RX IP 250 OP 636: Performed by: INTERNAL MEDICINE

## 2019-01-01 PROCEDURE — 25000125 ZZHC RX 250: Performed by: FAMILY MEDICINE

## 2019-01-01 PROCEDURE — 82140 ASSAY OF AMMONIA: CPT | Performed by: STUDENT IN AN ORGANIZED HEALTH CARE EDUCATION/TRAINING PROGRAM

## 2019-01-01 PROCEDURE — 74019 RADEX ABDOMEN 2 VIEWS: CPT

## 2019-01-01 PROCEDURE — 85018 HEMOGLOBIN: CPT | Performed by: INTERNAL MEDICINE

## 2019-01-01 PROCEDURE — 96365 THER/PROPH/DIAG IV INF INIT: CPT | Mod: 59 | Performed by: STUDENT IN AN ORGANIZED HEALTH CARE EDUCATION/TRAINING PROGRAM

## 2019-01-01 PROCEDURE — 84443 ASSAY THYROID STIM HORMONE: CPT | Performed by: INTERNAL MEDICINE

## 2019-01-01 PROCEDURE — 25000128 H RX IP 250 OP 636: Performed by: STUDENT IN AN ORGANIZED HEALTH CARE EDUCATION/TRAINING PROGRAM

## 2019-01-01 PROCEDURE — 81001 URINALYSIS AUTO W/SCOPE: CPT | Performed by: STUDENT IN AN ORGANIZED HEALTH CARE EDUCATION/TRAINING PROGRAM

## 2019-01-01 PROCEDURE — 74177 CT ABD & PELVIS W/CONTRAST: CPT

## 2019-01-01 PROCEDURE — 81001 URINALYSIS AUTO W/SCOPE: CPT | Performed by: FAMILY MEDICINE

## 2019-01-01 PROCEDURE — 87899 AGENT NOS ASSAY W/OPTIC: CPT | Performed by: PHYSICIAN ASSISTANT

## 2019-01-01 PROCEDURE — 99207 ZZC APP CREDIT; MD BILLING SHARED VISIT: CPT | Performed by: PHYSICIAN ASSISTANT

## 2019-01-01 PROCEDURE — 85025 COMPLETE CBC W/AUTO DIFF WBC: CPT | Performed by: STUDENT IN AN ORGANIZED HEALTH CARE EDUCATION/TRAINING PROGRAM

## 2019-01-01 PROCEDURE — 80076 HEPATIC FUNCTION PANEL: CPT | Performed by: INTERNAL MEDICINE

## 2019-01-01 PROCEDURE — 12000000 ZZH R&B MED SURG/OB

## 2019-01-01 PROCEDURE — 99284 EMERGENCY DEPT VISIT MOD MDM: CPT | Mod: Z6 | Performed by: EMERGENCY MEDICINE

## 2019-01-01 PROCEDURE — 36415 COLL VENOUS BLD VENIPUNCTURE: CPT | Performed by: FAMILY MEDICINE

## 2019-01-01 PROCEDURE — 84132 ASSAY OF SERUM POTASSIUM: CPT | Performed by: INTERNAL MEDICINE

## 2019-01-01 PROCEDURE — 80048 BASIC METABOLIC PNL TOTAL CA: CPT | Performed by: INTERNAL MEDICINE

## 2019-01-01 PROCEDURE — 99282 EMERGENCY DEPT VISIT SF MDM: CPT

## 2019-01-01 PROCEDURE — 85610 PROTHROMBIN TIME: CPT | Performed by: STUDENT IN AN ORGANIZED HEALTH CARE EDUCATION/TRAINING PROGRAM

## 2019-01-01 PROCEDURE — 82746 ASSAY OF FOLIC ACID SERUM: CPT | Performed by: INTERNAL MEDICINE

## 2019-01-01 PROCEDURE — 99233 SBSQ HOSP IP/OBS HIGH 50: CPT | Performed by: INTERNAL MEDICINE

## 2019-01-01 PROCEDURE — 25800030 ZZH RX IP 258 OP 636: Performed by: PHYSICIAN ASSISTANT

## 2019-01-01 PROCEDURE — 84484 ASSAY OF TROPONIN QUANT: CPT | Performed by: INTERNAL MEDICINE

## 2019-01-01 PROCEDURE — 86901 BLOOD TYPING SEROLOGIC RH(D): CPT | Performed by: INTERNAL MEDICINE

## 2019-01-01 PROCEDURE — 99283 EMERGENCY DEPT VISIT LOW MDM: CPT | Performed by: EMERGENCY MEDICINE

## 2019-01-01 PROCEDURE — 86709 HEPATITIS A IGM ANTIBODY: CPT | Performed by: INTERNAL MEDICINE

## 2019-01-01 PROCEDURE — 25000125 ZZHC RX 250: Performed by: STUDENT IN AN ORGANIZED HEALTH CARE EDUCATION/TRAINING PROGRAM

## 2019-01-01 PROCEDURE — 40000847 ZZHCL STATISTIC MORPHOLOGY W/INTERP HISTOLOGY TC 85060: Performed by: INTERNAL MEDICINE

## 2019-01-01 PROCEDURE — 25000132 ZZH RX MED GY IP 250 OP 250 PS 637: Performed by: STUDENT IN AN ORGANIZED HEALTH CARE EDUCATION/TRAINING PROGRAM

## 2019-01-01 PROCEDURE — 40000275 ZZH STATISTIC RCP TIME EA 10 MIN

## 2019-01-01 PROCEDURE — 86850 RBC ANTIBODY SCREEN: CPT | Performed by: INTERNAL MEDICINE

## 2019-01-01 PROCEDURE — 99310 SBSQ NF CARE HIGH MDM 45: CPT | Performed by: NURSE PRACTITIONER

## 2019-01-01 PROCEDURE — C9113 INJ PANTOPRAZOLE SODIUM, VIA: HCPCS | Performed by: INTERNAL MEDICINE

## 2019-01-01 PROCEDURE — 96367 TX/PROPH/DG ADDL SEQ IV INF: CPT | Performed by: STUDENT IN AN ORGANIZED HEALTH CARE EDUCATION/TRAINING PROGRAM

## 2019-01-01 PROCEDURE — 87493 C DIFF AMPLIFIED PROBE: CPT | Performed by: PHYSICIAN ASSISTANT

## 2019-01-01 PROCEDURE — 36416 COLLJ CAPILLARY BLOOD SPEC: CPT | Performed by: INTERNAL MEDICINE

## 2019-01-01 PROCEDURE — 87086 URINE CULTURE/COLONY COUNT: CPT | Performed by: FAMILY MEDICINE

## 2019-01-01 PROCEDURE — 84145 PROCALCITONIN (PCT): CPT | Performed by: STUDENT IN AN ORGANIZED HEALTH CARE EDUCATION/TRAINING PROGRAM

## 2019-01-01 PROCEDURE — 82270 OCCULT BLOOD FECES: CPT | Performed by: INTERNAL MEDICINE

## 2019-01-01 PROCEDURE — 87086 URINE CULTURE/COLONY COUNT: CPT | Performed by: STUDENT IN AN ORGANIZED HEALTH CARE EDUCATION/TRAINING PROGRAM

## 2019-01-01 PROCEDURE — 99223 1ST HOSP IP/OBS HIGH 75: CPT | Mod: AI | Performed by: INTERNAL MEDICINE

## 2019-01-01 PROCEDURE — 99285 EMERGENCY DEPT VISIT HI MDM: CPT | Mod: 25

## 2019-01-01 PROCEDURE — 99239 HOSP IP/OBS DSCHRG MGMT >30: CPT | Performed by: INTERNAL MEDICINE

## 2019-01-01 PROCEDURE — 85025 COMPLETE CBC W/AUTO DIFF WBC: CPT | Performed by: INTERNAL MEDICINE

## 2019-01-01 PROCEDURE — 87506 IADNA-DNA/RNA PROBE TQ 6-11: CPT | Performed by: PHYSICIAN ASSISTANT

## 2019-01-01 PROCEDURE — 83605 ASSAY OF LACTIC ACID: CPT | Performed by: STUDENT IN AN ORGANIZED HEALTH CARE EDUCATION/TRAINING PROGRAM

## 2019-01-01 PROCEDURE — 36415 COLL VENOUS BLD VENIPUNCTURE: CPT | Performed by: PHYSICIAN ASSISTANT

## 2019-01-01 PROCEDURE — 80053 COMPREHEN METABOLIC PANEL: CPT | Performed by: STUDENT IN AN ORGANIZED HEALTH CARE EDUCATION/TRAINING PROGRAM

## 2019-01-01 PROCEDURE — 86803 HEPATITIS C AB TEST: CPT | Performed by: INTERNAL MEDICINE

## 2019-01-01 PROCEDURE — 86900 BLOOD TYPING SEROLOGIC ABO: CPT | Performed by: INTERNAL MEDICINE

## 2019-01-01 PROCEDURE — 99282 EMERGENCY DEPT VISIT SF MDM: CPT | Mod: Z6 | Performed by: EMERGENCY MEDICINE

## 2019-01-01 PROCEDURE — 83550 IRON BINDING TEST: CPT | Performed by: INTERNAL MEDICINE

## 2019-01-01 PROCEDURE — 82607 VITAMIN B-12: CPT | Performed by: INTERNAL MEDICINE

## 2019-01-01 PROCEDURE — 87040 BLOOD CULTURE FOR BACTERIA: CPT | Performed by: STUDENT IN AN ORGANIZED HEALTH CARE EDUCATION/TRAINING PROGRAM

## 2019-01-01 PROCEDURE — 99285 EMERGENCY DEPT VISIT HI MDM: CPT | Mod: 25 | Performed by: STUDENT IN AN ORGANIZED HEALTH CARE EDUCATION/TRAINING PROGRAM

## 2019-01-01 PROCEDURE — 99305 1ST NF CARE MODERATE MDM 35: CPT | Mod: GW | Performed by: FAMILY MEDICINE

## 2019-01-01 PROCEDURE — 99285 EMERGENCY DEPT VISIT HI MDM: CPT | Mod: Z6 | Performed by: STUDENT IN AN ORGANIZED HEALTH CARE EDUCATION/TRAINING PROGRAM

## 2019-01-01 PROCEDURE — 93306 TTE W/DOPPLER COMPLETE: CPT | Mod: 26 | Performed by: INTERNAL MEDICINE

## 2019-01-01 PROCEDURE — P9016 RBC LEUKOCYTES REDUCED: HCPCS | Performed by: INTERNAL MEDICINE

## 2019-01-01 PROCEDURE — 83690 ASSAY OF LIPASE: CPT | Performed by: PHYSICIAN ASSISTANT

## 2019-01-01 PROCEDURE — 84484 ASSAY OF TROPONIN QUANT: CPT | Performed by: PHYSICIAN ASSISTANT

## 2019-01-01 PROCEDURE — 25000125 ZZHC RX 250: Performed by: PHYSICIAN ASSISTANT

## 2019-01-01 PROCEDURE — 82803 BLOOD GASES ANY COMBINATION: CPT | Performed by: STUDENT IN AN ORGANIZED HEALTH CARE EDUCATION/TRAINING PROGRAM

## 2019-01-01 PROCEDURE — 83690 ASSAY OF LIPASE: CPT | Performed by: FAMILY MEDICINE

## 2019-01-01 PROCEDURE — 83605 ASSAY OF LACTIC ACID: CPT | Performed by: INTERNAL MEDICINE

## 2019-01-01 PROCEDURE — 86923 COMPATIBILITY TEST ELECTRIC: CPT | Performed by: INTERNAL MEDICINE

## 2019-01-01 PROCEDURE — 84484 ASSAY OF TROPONIN QUANT: CPT | Performed by: STUDENT IN AN ORGANIZED HEALTH CARE EDUCATION/TRAINING PROGRAM

## 2019-01-01 PROCEDURE — 83540 ASSAY OF IRON: CPT | Performed by: INTERNAL MEDICINE

## 2019-01-01 PROCEDURE — 85027 COMPLETE CBC AUTOMATED: CPT | Performed by: PHYSICIAN ASSISTANT

## 2019-01-01 PROCEDURE — 87040 BLOOD CULTURE FOR BACTERIA: CPT | Performed by: FAMILY MEDICINE

## 2019-01-01 PROCEDURE — 25000128 H RX IP 250 OP 636: Performed by: FAMILY MEDICINE

## 2019-01-01 PROCEDURE — 83880 ASSAY OF NATRIURETIC PEPTIDE: CPT | Performed by: STUDENT IN AN ORGANIZED HEALTH CARE EDUCATION/TRAINING PROGRAM

## 2019-01-01 PROCEDURE — 85730 THROMBOPLASTIN TIME PARTIAL: CPT | Performed by: PHYSICIAN ASSISTANT

## 2019-01-01 PROCEDURE — 85045 AUTOMATED RETICULOCYTE COUNT: CPT | Performed by: INTERNAL MEDICINE

## 2019-01-01 PROCEDURE — 85060 BLOOD SMEAR INTERPRETATION: CPT | Performed by: INTERNAL MEDICINE

## 2019-01-01 PROCEDURE — 87340 HEPATITIS B SURFACE AG IA: CPT | Performed by: INTERNAL MEDICINE

## 2019-01-01 RX ORDER — AMOXICILLIN AND CLAVULANATE POTASSIUM 250; 62.5 MG/5ML; MG/5ML
875 POWDER, FOR SUSPENSION ORAL 2 TIMES DAILY
DISCHARGE
Start: 2019-01-01 | End: 2019-01-01

## 2019-01-01 RX ORDER — NALOXONE HYDROCHLORIDE 0.4 MG/ML
.1-.4 INJECTION, SOLUTION INTRAMUSCULAR; INTRAVENOUS; SUBCUTANEOUS
Status: DISCONTINUED | OUTPATIENT
Start: 2019-01-01 | End: 2019-01-01 | Stop reason: HOSPADM

## 2019-01-01 RX ORDER — CIPROFLOXACIN 250 MG/5ML
KIT ORAL 2 TIMES DAILY
Status: ON HOLD | COMMUNITY
Start: 2019-01-01 | End: 2019-01-01

## 2019-01-01 RX ORDER — PROCHLORPERAZINE MALEATE 5 MG
5 TABLET ORAL EVERY 6 HOURS PRN
Status: DISCONTINUED | OUTPATIENT
Start: 2019-01-01 | End: 2019-01-01 | Stop reason: HOSPADM

## 2019-01-01 RX ORDER — POTASSIUM CL/LIDO/0.9 % NACL 10MEQ/0.1L
10 INTRAVENOUS SOLUTION, PIGGYBACK (ML) INTRAVENOUS
Status: DISCONTINUED | OUTPATIENT
Start: 2019-01-01 | End: 2019-01-01

## 2019-01-01 RX ORDER — MAGNESIUM OXIDE 400 MG/1
400 TABLET ORAL DAILY
DISCHARGE
Start: 2019-01-01 | End: 2019-01-01

## 2019-01-01 RX ORDER — BISACODYL 10 MG
10 SUPPOSITORY, RECTAL RECTAL DAILY PRN
COMMUNITY

## 2019-01-01 RX ORDER — LIDOCAINE 4 G/G
1 PATCH TOPICAL
Status: DISCONTINUED | OUTPATIENT
Start: 2019-01-01 | End: 2019-01-01 | Stop reason: HOSPADM

## 2019-01-01 RX ORDER — NITROGLYCERIN 0.4 MG/1
0.4 TABLET SUBLINGUAL EVERY 5 MIN PRN
Status: DISCONTINUED | OUTPATIENT
Start: 2019-01-01 | End: 2019-01-01 | Stop reason: HOSPADM

## 2019-01-01 RX ORDER — SODIUM BICARBONATE
POWDER (GRAM) MISCELLANEOUS PRN
COMMUNITY

## 2019-01-01 RX ORDER — PROCHLORPERAZINE 25 MG
12.5 SUPPOSITORY, RECTAL RECTAL EVERY 12 HOURS PRN
Status: DISCONTINUED | OUTPATIENT
Start: 2019-01-01 | End: 2019-01-01 | Stop reason: HOSPADM

## 2019-01-01 RX ORDER — FUROSEMIDE 20 MG
20 TABLET ORAL DAILY
COMMUNITY

## 2019-01-01 RX ORDER — METRONIDAZOLE 500 MG/1
500 TABLET ORAL 3 TIMES DAILY
Qty: 12 TABLET | Refills: 0 | DISCHARGE
Start: 2019-01-01 | End: 2019-01-01

## 2019-01-01 RX ORDER — HEPARIN SODIUM 5000 [USP'U]/ML
5000 INJECTION, SOLUTION INTRAVENOUS; SUBCUTANEOUS 2 TIMES DAILY
COMMUNITY
Start: 2019-01-01

## 2019-01-01 RX ORDER — METOPROLOL TARTRATE 25 MG/1
25 TABLET, FILM COATED ORAL 2 TIMES DAILY
COMMUNITY
Start: 2019-01-01

## 2019-01-01 RX ORDER — METOPROLOL TARTRATE 1 MG/ML
5 INJECTION, SOLUTION INTRAVENOUS EVERY 6 HOURS
Status: DISCONTINUED | OUTPATIENT
Start: 2019-01-01 | End: 2019-01-01

## 2019-01-01 RX ORDER — HEPARIN SODIUM 5000 [USP'U]/ML
5000 INJECTION, SOLUTION INTRAVENOUS; SUBCUTANEOUS 2 TIMES DAILY
Status: DISCONTINUED | OUTPATIENT
Start: 2019-01-01 | End: 2019-01-01

## 2019-01-01 RX ORDER — ALBUTEROL SULFATE 90 UG/1
1-2 AEROSOL, METERED RESPIRATORY (INHALATION) EVERY 4 HOURS PRN
COMMUNITY

## 2019-01-01 RX ORDER — LIDOCAINE 40 MG/G
CREAM TOPICAL
Status: DISCONTINUED | OUTPATIENT
Start: 2019-01-01 | End: 2019-01-01

## 2019-01-01 RX ORDER — IOPAMIDOL 755 MG/ML
88 INJECTION, SOLUTION INTRAVASCULAR ONCE
Status: COMPLETED | OUTPATIENT
Start: 2019-01-01 | End: 2019-01-01

## 2019-01-01 RX ORDER — ASPIRIN 300 MG/1
300 SUPPOSITORY RECTAL ONCE
Status: COMPLETED | OUTPATIENT
Start: 2019-01-01 | End: 2019-01-01

## 2019-01-01 RX ORDER — ASPIRIN 81 MG/1
81 TABLET, CHEWABLE ORAL DAILY
Status: DISCONTINUED | OUTPATIENT
Start: 2019-01-01 | End: 2019-01-01 | Stop reason: HOSPADM

## 2019-01-01 RX ORDER — OMEPRAZOLE 20 MG/1
20 TABLET, DELAYED RELEASE ORAL DAILY
Status: ON HOLD
Start: 2019-01-01 | End: 2019-01-01

## 2019-01-01 RX ORDER — ASPIRIN 81 MG/1
81 TABLET, CHEWABLE ORAL DAILY
COMMUNITY

## 2019-01-01 RX ORDER — ESCITALOPRAM OXALATE 10 MG/1
10 TABLET ORAL DAILY
Status: DISCONTINUED | OUTPATIENT
Start: 2019-01-01 | End: 2019-01-01 | Stop reason: HOSPADM

## 2019-01-01 RX ORDER — METOPROLOL TARTRATE 25 MG/1
25 TABLET, FILM COATED ORAL 2 TIMES DAILY
Status: DISCONTINUED | OUTPATIENT
Start: 2019-01-01 | End: 2019-01-01 | Stop reason: HOSPADM

## 2019-01-01 RX ORDER — ALBUTEROL SULFATE 5 MG/ML
2.5 SOLUTION RESPIRATORY (INHALATION) EVERY 6 HOURS PRN
Status: DISCONTINUED | OUTPATIENT
Start: 2019-01-01 | End: 2019-01-01 | Stop reason: HOSPADM

## 2019-01-01 RX ORDER — IPRATROPIUM BROMIDE AND ALBUTEROL SULFATE 2.5; .5 MG/3ML; MG/3ML
1 SOLUTION RESPIRATORY (INHALATION) 4 TIMES DAILY
COMMUNITY
Start: 2019-01-01 | End: 2019-01-01

## 2019-01-01 RX ORDER — OXYCODONE HYDROCHLORIDE 5 MG/1
5-10 TABLET ORAL EVERY 4 HOURS PRN
Qty: 60 TABLET | Refills: 0 | Status: ON HOLD | OUTPATIENT
Start: 2019-01-01 | End: 2019-01-01

## 2019-01-01 RX ORDER — KETOROLAC TROMETHAMINE 15 MG/ML
15 INJECTION, SOLUTION INTRAMUSCULAR; INTRAVENOUS ONCE
Status: COMPLETED | OUTPATIENT
Start: 2019-01-01 | End: 2019-01-01

## 2019-01-01 RX ORDER — POTASSIUM CHLORIDE 1500 MG/1
20-40 TABLET, EXTENDED RELEASE ORAL
Status: DISCONTINUED | OUTPATIENT
Start: 2019-01-01 | End: 2019-01-01

## 2019-01-01 RX ORDER — TRAZODONE HYDROCHLORIDE 50 MG/1
25 TABLET, FILM COATED ORAL AT BEDTIME
COMMUNITY
Start: 2019-01-01

## 2019-01-01 RX ORDER — ATORVASTATIN CALCIUM 80 MG/1
80 TABLET, FILM COATED ORAL EVERY EVENING
Status: DISCONTINUED | OUTPATIENT
Start: 2019-01-01 | End: 2019-01-01 | Stop reason: HOSPADM

## 2019-01-01 RX ORDER — POTASSIUM CHLORIDE 7.45 MG/ML
10 INJECTION INTRAVENOUS
Status: DISCONTINUED | OUTPATIENT
Start: 2019-01-01 | End: 2019-01-01 | Stop reason: HOSPADM

## 2019-01-01 RX ORDER — SODIUM CHLORIDE, SODIUM LACTATE, POTASSIUM CHLORIDE, CALCIUM CHLORIDE 600; 310; 30; 20 MG/100ML; MG/100ML; MG/100ML; MG/100ML
INJECTION, SOLUTION INTRAVENOUS CONTINUOUS
Status: DISCONTINUED | OUTPATIENT
Start: 2019-01-01 | End: 2019-01-01

## 2019-01-01 RX ORDER — CEFAZOLIN SODIUM 1 G/50ML
1750 SOLUTION INTRAVENOUS EVERY 12 HOURS
Status: DISCONTINUED | OUTPATIENT
Start: 2019-01-01 | End: 2019-01-01

## 2019-01-01 RX ORDER — NITROGLYCERIN 0.4 MG/1
TABLET SUBLINGUAL
Start: 2019-01-01

## 2019-01-01 RX ORDER — MORPHINE SULFATE 10 MG/5ML
SOLUTION ORAL
COMMUNITY

## 2019-01-01 RX ORDER — ACETAMINOPHEN 325 MG/1
650 TABLET ORAL EVERY 12 HOURS PRN
Status: ON HOLD | COMMUNITY
Start: 2019-01-01 | End: 2019-01-01 | Stop reason: ALTCHOICE

## 2019-01-01 RX ORDER — LANOLIN ALCOHOL/MO/W.PET/CERES
3 CREAM (GRAM) TOPICAL AT BEDTIME
COMMUNITY
Start: 2019-01-01

## 2019-01-01 RX ORDER — DOXAZOSIN 2 MG/1
2 TABLET ORAL AT BEDTIME
Status: DISCONTINUED | OUTPATIENT
Start: 2019-01-01 | End: 2019-01-01 | Stop reason: HOSPADM

## 2019-01-01 RX ORDER — LIDOCAINE HYDROCHLORIDE 20 MG/ML
JELLY TOPICAL ONCE
Status: COMPLETED | OUTPATIENT
Start: 2019-01-01 | End: 2019-01-01

## 2019-01-01 RX ORDER — MIRTAZAPINE 7.5 MG/1
22.5 TABLET, FILM COATED ORAL DAILY
COMMUNITY
Start: 2019-01-01

## 2019-01-01 RX ORDER — ACETAMINOPHEN 650 MG/1
650 SUPPOSITORY RECTAL EVERY 4 HOURS PRN
Status: DISCONTINUED | OUTPATIENT
Start: 2019-01-01 | End: 2019-01-01 | Stop reason: HOSPADM

## 2019-01-01 RX ORDER — POTASSIUM CHLORIDE 29.8 MG/ML
20 INJECTION INTRAVENOUS
Status: DISCONTINUED | OUTPATIENT
Start: 2019-01-01 | End: 2019-01-01 | Stop reason: CLARIF

## 2019-01-01 RX ORDER — OXYCODONE HYDROCHLORIDE 5 MG/1
5-10 TABLET ORAL EVERY 4 HOURS PRN
COMMUNITY
Start: 2019-01-01 | End: 2019-01-01

## 2019-01-01 RX ORDER — METOPROLOL TARTRATE 1 MG/ML
2.5 INJECTION, SOLUTION INTRAVENOUS EVERY 6 HOURS
Status: DISCONTINUED | OUTPATIENT
Start: 2019-01-01 | End: 2019-01-01

## 2019-01-01 RX ORDER — FENTANYL 12.5 UG/1
1 PATCH TRANSDERMAL
COMMUNITY

## 2019-01-01 RX ORDER — NYSTATIN 100000 [USP'U]/G
POWDER TOPICAL
Status: ON HOLD | COMMUNITY
Start: 2019-01-01 | End: 2019-01-01

## 2019-01-01 RX ORDER — IPRATROPIUM BROMIDE AND ALBUTEROL SULFATE 2.5; .5 MG/3ML; MG/3ML
1 SOLUTION RESPIRATORY (INHALATION) EVERY 4 HOURS PRN
Status: DISCONTINUED | OUTPATIENT
Start: 2019-01-01 | End: 2019-01-01 | Stop reason: HOSPADM

## 2019-01-01 RX ORDER — AMINO ACIDS/PROTEIN HYDROLYS 11G-40/45
1 LIQUID IN PACKET (ML) ORAL DAILY
COMMUNITY
Start: 2019-01-01

## 2019-01-01 RX ORDER — HEPARIN SODIUM 5000 [USP'U]/.5ML
5000 INJECTION, SOLUTION INTRAVENOUS; SUBCUTANEOUS 2 TIMES DAILY
Status: DISCONTINUED | OUTPATIENT
Start: 2019-01-01 | End: 2019-01-01 | Stop reason: HOSPADM

## 2019-01-01 RX ORDER — IPRATROPIUM BROMIDE AND ALBUTEROL SULFATE 2.5; .5 MG/3ML; MG/3ML
1 SOLUTION RESPIRATORY (INHALATION) 2 TIMES DAILY
COMMUNITY

## 2019-01-01 RX ORDER — LIDOCAINE 40 MG/G
CREAM TOPICAL
Status: DISCONTINUED | OUTPATIENT
Start: 2019-01-01 | End: 2019-01-01 | Stop reason: HOSPADM

## 2019-01-01 RX ORDER — ESCITALOPRAM OXALATE 10 MG/1
10 TABLET ORAL DAILY
COMMUNITY
Start: 2019-01-01

## 2019-01-01 RX ORDER — IPRATROPIUM BROMIDE AND ALBUTEROL SULFATE 2.5; .5 MG/3ML; MG/3ML
1 SOLUTION RESPIRATORY (INHALATION) 2 TIMES DAILY
Status: ON HOLD | COMMUNITY
Start: 2019-01-01 | End: 2019-01-01

## 2019-01-01 RX ORDER — IOPAMIDOL 755 MG/ML
93 INJECTION, SOLUTION INTRAVASCULAR ONCE
Status: COMPLETED | OUTPATIENT
Start: 2019-01-01 | End: 2019-01-01

## 2019-01-01 RX ORDER — ATORVASTATIN CALCIUM 80 MG/1
80 TABLET, FILM COATED ORAL DAILY
COMMUNITY
Start: 2019-01-01

## 2019-01-01 RX ORDER — ONDANSETRON 4 MG/1
4 TABLET, ORALLY DISINTEGRATING ORAL EVERY 6 HOURS PRN
Status: DISCONTINUED | OUTPATIENT
Start: 2019-01-01 | End: 2019-01-01 | Stop reason: HOSPADM

## 2019-01-01 RX ORDER — LORAZEPAM 0.5 MG/1
0.5 TABLET ORAL EVERY 4 HOURS PRN
COMMUNITY

## 2019-01-01 RX ORDER — ONDANSETRON 2 MG/ML
4 INJECTION INTRAMUSCULAR; INTRAVENOUS EVERY 6 HOURS PRN
Status: DISCONTINUED | OUTPATIENT
Start: 2019-01-01 | End: 2019-01-01 | Stop reason: HOSPADM

## 2019-01-01 RX ORDER — POTASSIUM CL/LIDO/0.9 % NACL 10MEQ/0.1L
10 INTRAVENOUS SOLUTION, PIGGYBACK (ML) INTRAVENOUS
Status: DISCONTINUED | OUTPATIENT
Start: 2019-01-01 | End: 2019-01-01 | Stop reason: HOSPADM

## 2019-01-01 RX ORDER — POTASSIUM CHLORIDE 1.5 G/1.58G
20-40 POWDER, FOR SOLUTION ORAL
Status: DISCONTINUED | OUTPATIENT
Start: 2019-01-01 | End: 2019-01-01 | Stop reason: HOSPADM

## 2019-01-01 RX ORDER — MICONAZOLE NITRATE 20 MG/G
CREAM TOPICAL 2 TIMES DAILY
COMMUNITY

## 2019-01-01 RX ORDER — DOXAZOSIN 2 MG/1
2 TABLET ORAL AT BEDTIME
COMMUNITY
Start: 2019-01-01 | End: 2019-01-01

## 2019-01-01 RX ORDER — CIPROFLOXACIN 2 MG/ML
400 INJECTION, SOLUTION INTRAVENOUS EVERY 12 HOURS
Status: DISCONTINUED | OUTPATIENT
Start: 2019-01-01 | End: 2019-01-01

## 2019-01-01 RX ORDER — MAGNESIUM OXIDE 400 MG/1
400 TABLET ORAL DAILY
Status: ON HOLD | COMMUNITY
Start: 2019-01-01 | End: 2019-01-01

## 2019-01-01 RX ADMIN — ACETAMINOPHEN 650 MG: 650 SUPPOSITORY RECTAL at 15:43

## 2019-01-01 RX ADMIN — METRONIDAZOLE 500 MG: 500 INJECTION, SOLUTION INTRAVENOUS at 03:13

## 2019-01-01 RX ADMIN — IOPAMIDOL 88 ML: 755 INJECTION, SOLUTION INTRAVENOUS at 15:27

## 2019-01-01 RX ADMIN — TAZOBACTAM SODIUM AND PIPERACILLIN SODIUM 4.5 G: 500; 4 INJECTION, SOLUTION INTRAVENOUS at 07:51

## 2019-01-01 RX ADMIN — LANSOPRAZOLE 15 MG: 15 TABLET, ORALLY DISINTEGRATING, DELAYED RELEASE ORAL at 06:25

## 2019-01-01 RX ADMIN — SODIUM CHLORIDE 62 ML: 9 INJECTION, SOLUTION INTRAVENOUS at 15:27

## 2019-01-01 RX ADMIN — METOPROLOL TARTRATE 25 MG: 25 TABLET, FILM COATED ORAL at 09:15

## 2019-01-01 RX ADMIN — TAZOBACTAM SODIUM AND PIPERACILLIN SODIUM 4.5 G: 500; 4 INJECTION, SOLUTION INTRAVENOUS at 13:54

## 2019-01-01 RX ADMIN — TAZOBACTAM SODIUM AND PIPERACILLIN SODIUM 4.5 G: 500; 4 INJECTION, SOLUTION INTRAVENOUS at 02:38

## 2019-01-01 RX ADMIN — HEPARIN SODIUM 5000 UNITS: 5000 INJECTION, SOLUTION INTRAVENOUS; SUBCUTANEOUS at 11:35

## 2019-01-01 RX ADMIN — HEPARIN SODIUM 5000 UNITS: 5000 INJECTION, SOLUTION INTRAVENOUS; SUBCUTANEOUS at 07:51

## 2019-01-01 RX ADMIN — METOPROLOL TARTRATE 5 MG: 5 INJECTION INTRAVENOUS at 09:02

## 2019-01-01 RX ADMIN — PANTOPRAZOLE SODIUM 40 MG: 40 INJECTION, POWDER, FOR SOLUTION INTRAVENOUS at 09:28

## 2019-01-01 RX ADMIN — TAZOBACTAM SODIUM AND PIPERACILLIN SODIUM 4.5 G: 500; 4 INJECTION, SOLUTION INTRAVENOUS at 13:31

## 2019-01-01 RX ADMIN — MICONAZOLE NITRATE: 20 POWDER TOPICAL at 20:47

## 2019-01-01 RX ADMIN — SODIUM CHLORIDE, POTASSIUM CHLORIDE, SODIUM LACTATE AND CALCIUM CHLORIDE: 600; 310; 30; 20 INJECTION, SOLUTION INTRAVENOUS at 08:00

## 2019-01-01 RX ADMIN — TAZOBACTAM SODIUM AND PIPERACILLIN SODIUM 4.5 G: 500; 4 INJECTION, SOLUTION INTRAVENOUS at 05:24

## 2019-01-01 RX ADMIN — CIPROFLOXACIN 400 MG: 2 INJECTION, SOLUTION INTRAVENOUS at 03:11

## 2019-01-01 RX ADMIN — IOPAMIDOL 93 ML: 755 INJECTION, SOLUTION INTRAVENOUS at 01:28

## 2019-01-01 RX ADMIN — Medication 10 MEQ: at 12:43

## 2019-01-01 RX ADMIN — TAZOBACTAM SODIUM AND PIPERACILLIN SODIUM 4.5 G: 500; 4 INJECTION, SOLUTION INTRAVENOUS at 00:02

## 2019-01-01 RX ADMIN — SODIUM CHLORIDE, POTASSIUM CHLORIDE, SODIUM LACTATE AND CALCIUM CHLORIDE: 600; 310; 30; 20 INJECTION, SOLUTION INTRAVENOUS at 11:04

## 2019-01-01 RX ADMIN — LIDOCAINE 1 PATCH: 560 PATCH PERCUTANEOUS; TOPICAL; TRANSDERMAL at 19:30

## 2019-01-01 RX ADMIN — METRONIDAZOLE 500 MG: 500 INJECTION, SOLUTION INTRAVENOUS at 15:52

## 2019-01-01 RX ADMIN — HEPARIN SODIUM 5000 UNITS: 5000 INJECTION, SOLUTION INTRAVENOUS; SUBCUTANEOUS at 09:02

## 2019-01-01 RX ADMIN — LIDOCAINE 1 PATCH: 560 PATCH PERCUTANEOUS; TOPICAL; TRANSDERMAL at 19:48

## 2019-01-01 RX ADMIN — ESCITALOPRAM OXALATE 10 MG: 10 TABLET ORAL at 09:14

## 2019-01-01 RX ADMIN — LIDOCAINE 1 PATCH: 560 PATCH PERCUTANEOUS; TOPICAL; TRANSDERMAL at 20:15

## 2019-01-01 RX ADMIN — MICONAZOLE NITRATE: 20 POWDER TOPICAL at 09:03

## 2019-01-01 RX ADMIN — TAZOBACTAM SODIUM AND PIPERACILLIN SODIUM 4.5 G: 500; 4 INJECTION, SOLUTION INTRAVENOUS at 20:32

## 2019-01-01 RX ADMIN — VANCOMYCIN HYDROCHLORIDE 1750 MG: 1 INJECTION, POWDER, LYOPHILIZED, FOR SOLUTION INTRAVENOUS at 14:30

## 2019-01-01 RX ADMIN — PANTOPRAZOLE SODIUM 40 MG: 40 INJECTION, POWDER, FOR SOLUTION INTRAVENOUS at 18:56

## 2019-01-01 RX ADMIN — METRONIDAZOLE 500 MG: 500 INJECTION, SOLUTION INTRAVENOUS at 22:04

## 2019-01-01 RX ADMIN — TAZOBACTAM SODIUM AND PIPERACILLIN SODIUM 4.5 G: 500; 4 INJECTION, SOLUTION INTRAVENOUS at 14:51

## 2019-01-01 RX ADMIN — TAZOBACTAM SODIUM AND PIPERACILLIN SODIUM 4.5 G: 500; 4 INJECTION, SOLUTION INTRAVENOUS at 19:04

## 2019-01-01 RX ADMIN — LIDOCAINE HYDROCHLORIDE: 20 JELLY TOPICAL at 14:33

## 2019-01-01 RX ADMIN — METOPROLOL TARTRATE 5 MG: 5 INJECTION INTRAVENOUS at 19:48

## 2019-01-01 RX ADMIN — TAZOBACTAM SODIUM AND PIPERACILLIN SODIUM 4.5 G: 500; 4 INJECTION, SOLUTION INTRAVENOUS at 17:06

## 2019-01-01 RX ADMIN — ASPIRIN 81 MG 81 MG: 81 TABLET ORAL at 08:45

## 2019-01-01 RX ADMIN — METRONIDAZOLE 500 MG: 500 INJECTION, SOLUTION INTRAVENOUS at 13:31

## 2019-01-01 RX ADMIN — TAZOBACTAM SODIUM AND PIPERACILLIN SODIUM 4.5 G: 500; 4 INJECTION, SOLUTION INTRAVENOUS at 01:18

## 2019-01-01 RX ADMIN — MICONAZOLE NITRATE: 20 POWDER TOPICAL at 07:50

## 2019-01-01 RX ADMIN — MICONAZOLE NITRATE: 20 POWDER TOPICAL at 09:06

## 2019-01-01 RX ADMIN — TAZOBACTAM SODIUM AND PIPERACILLIN SODIUM 4.5 G: 500; 4 INJECTION, SOLUTION INTRAVENOUS at 07:39

## 2019-01-01 RX ADMIN — HEPARIN SODIUM 5000 UNITS: 5000 INJECTION, SOLUTION INTRAVENOUS; SUBCUTANEOUS at 20:32

## 2019-01-01 RX ADMIN — MICONAZOLE NITRATE: 20 POWDER TOPICAL at 19:31

## 2019-01-01 RX ADMIN — METRONIDAZOLE 500 MG: 500 INJECTION, SOLUTION INTRAVENOUS at 03:23

## 2019-01-01 RX ADMIN — MICONAZOLE NITRATE: 20 POWDER TOPICAL at 20:16

## 2019-01-01 RX ADMIN — METRONIDAZOLE 500 MG: 500 INJECTION, SOLUTION INTRAVENOUS at 02:56

## 2019-01-01 RX ADMIN — ASPIRIN 81 MG 81 MG: 81 TABLET ORAL at 09:39

## 2019-01-01 RX ADMIN — HEPARIN SODIUM 5000 UNITS: 5000 INJECTION, SOLUTION INTRAVENOUS; SUBCUTANEOUS at 20:15

## 2019-01-01 RX ADMIN — METRONIDAZOLE 500 MG: 500 INJECTION, SOLUTION INTRAVENOUS at 09:01

## 2019-01-01 RX ADMIN — SODIUM CHLORIDE 68 ML: 9 INJECTION, SOLUTION INTRAVENOUS at 01:28

## 2019-01-01 RX ADMIN — TAZOBACTAM SODIUM AND PIPERACILLIN SODIUM 4.5 G: 500; 4 INJECTION, SOLUTION INTRAVENOUS at 02:13

## 2019-01-01 RX ADMIN — METOPROLOL TARTRATE 2.5 MG: 5 INJECTION, SOLUTION INTRAVENOUS at 18:43

## 2019-01-01 RX ADMIN — ATORVASTATIN CALCIUM 80 MG: 80 TABLET, FILM COATED ORAL at 17:11

## 2019-01-01 RX ADMIN — METOPROLOL TARTRATE 5 MG: 5 INJECTION INTRAVENOUS at 02:13

## 2019-01-01 RX ADMIN — DOXAZOSIN 2 MG: 2 TABLET ORAL at 22:04

## 2019-01-01 RX ADMIN — METOPROLOL TARTRATE 5 MG: 5 INJECTION INTRAVENOUS at 14:46

## 2019-01-01 RX ADMIN — HEPARIN SODIUM 5000 UNITS: 5000 INJECTION, SOLUTION INTRAVENOUS; SUBCUTANEOUS at 08:45

## 2019-01-01 RX ADMIN — METOPROLOL TARTRATE 25 MG: 25 TABLET, FILM COATED ORAL at 21:01

## 2019-01-01 RX ADMIN — METOPROLOL TARTRATE 5 MG: 5 INJECTION INTRAVENOUS at 18:50

## 2019-01-01 RX ADMIN — ESCITALOPRAM OXALATE 10 MG: 10 TABLET ORAL at 08:45

## 2019-01-01 RX ADMIN — METOPROLOL TARTRATE 5 MG: 5 INJECTION INTRAVENOUS at 02:47

## 2019-01-01 RX ADMIN — TAZOBACTAM SODIUM AND PIPERACILLIN SODIUM 4.5 G: 500; 4 INJECTION, SOLUTION INTRAVENOUS at 02:30

## 2019-01-01 RX ADMIN — ASPIRIN 300 MG: 300 SUPPOSITORY RECTAL at 03:25

## 2019-01-01 RX ADMIN — SODIUM CHLORIDE, POTASSIUM CHLORIDE, SODIUM LACTATE AND CALCIUM CHLORIDE: 600; 310; 30; 20 INJECTION, SOLUTION INTRAVENOUS at 19:01

## 2019-01-01 RX ADMIN — METRONIDAZOLE 500 MG: 500 INJECTION, SOLUTION INTRAVENOUS at 06:14

## 2019-01-01 RX ADMIN — METRONIDAZOLE 500 MG: 500 INJECTION, SOLUTION INTRAVENOUS at 10:12

## 2019-01-01 RX ADMIN — POTASSIUM CHLORIDE 20 MEQ: 1.5 POWDER, FOR SOLUTION ORAL at 11:32

## 2019-01-01 RX ADMIN — METOPROLOL TARTRATE 25 MG: 25 TABLET, FILM COATED ORAL at 08:45

## 2019-01-01 RX ADMIN — Medication 10 MEQ: at 11:40

## 2019-01-01 RX ADMIN — SODIUM CHLORIDE 500 ML: 9 INJECTION, SOLUTION INTRAVENOUS at 02:55

## 2019-01-01 RX ADMIN — PANTOPRAZOLE SODIUM 40 MG: 40 INJECTION, POWDER, FOR SOLUTION INTRAVENOUS at 17:06

## 2019-01-01 RX ADMIN — ASPIRIN 81 MG 81 MG: 81 TABLET ORAL at 09:34

## 2019-01-01 RX ADMIN — METRONIDAZOLE 500 MG: 500 INJECTION, SOLUTION INTRAVENOUS at 21:39

## 2019-01-01 RX ADMIN — TAZOBACTAM SODIUM AND PIPERACILLIN SODIUM 4.5 G: 500; 4 INJECTION, SOLUTION INTRAVENOUS at 20:47

## 2019-01-01 RX ADMIN — METRONIDAZOLE 500 MG: 500 INJECTION, SOLUTION INTRAVENOUS at 09:32

## 2019-01-01 RX ADMIN — POTASSIUM CHLORIDE 40 MEQ: 1.5 POWDER, FOR SOLUTION ORAL at 09:14

## 2019-01-01 RX ADMIN — KETOROLAC TROMETHAMINE 15 MG: 15 INJECTION, SOLUTION INTRAMUSCULAR; INTRAVENOUS at 18:44

## 2019-01-01 RX ADMIN — TAZOBACTAM SODIUM AND PIPERACILLIN SODIUM 4.5 G: 500; 4 INJECTION, SOLUTION INTRAVENOUS at 08:17

## 2019-01-01 RX ADMIN — SODIUM CHLORIDE, POTASSIUM CHLORIDE, SODIUM LACTATE AND CALCIUM CHLORIDE 500 ML: 600; 310; 30; 20 INJECTION, SOLUTION INTRAVENOUS at 19:30

## 2019-01-01 RX ADMIN — METRONIDAZOLE 500 MG: 500 INJECTION, SOLUTION INTRAVENOUS at 12:05

## 2019-01-01 RX ADMIN — SODIUM CHLORIDE, POTASSIUM CHLORIDE, SODIUM LACTATE AND CALCIUM CHLORIDE: 600; 310; 30; 20 INJECTION, SOLUTION INTRAVENOUS at 02:48

## 2019-01-01 RX ADMIN — METOPROLOL TARTRATE 2.5 MG: 5 INJECTION, SOLUTION INTRAVENOUS at 13:33

## 2019-01-01 RX ADMIN — HEPARIN SODIUM 5000 UNITS: 5000 INJECTION, SOLUTION INTRAVENOUS; SUBCUTANEOUS at 19:31

## 2019-01-01 RX ADMIN — METRONIDAZOLE 500 MG: 500 INJECTION, SOLUTION INTRAVENOUS at 21:11

## 2019-01-01 RX ADMIN — METRONIDAZOLE 500 MG: 500 INJECTION, SOLUTION INTRAVENOUS at 14:34

## 2019-01-01 RX ADMIN — TAZOBACTAM SODIUM AND PIPERACILLIN SODIUM 4.5 G: 500; 4 INJECTION, SOLUTION INTRAVENOUS at 11:27

## 2019-01-01 RX ADMIN — PANTOPRAZOLE SODIUM 40 MG: 40 INJECTION, POWDER, FOR SOLUTION INTRAVENOUS at 19:48

## 2019-01-01 RX ADMIN — HEPARIN SODIUM 5000 UNITS: 5000 INJECTION, SOLUTION INTRAVENOUS; SUBCUTANEOUS at 19:48

## 2019-01-01 RX ADMIN — HUMAN ALBUMIN MICROSPHERES AND PERFLUTREN 2 ML: 10; .22 INJECTION, SOLUTION INTRAVENOUS at 08:20

## 2019-01-01 RX ADMIN — MICONAZOLE NITRATE: 20 POWDER TOPICAL at 20:33

## 2019-01-01 RX ADMIN — METRONIDAZOLE 500 MG: 500 INJECTION, SOLUTION INTRAVENOUS at 18:44

## 2019-01-01 RX ADMIN — METOPROLOL TARTRATE 2.5 MG: 5 INJECTION, SOLUTION INTRAVENOUS at 00:59

## 2019-01-01 RX ADMIN — VANCOMYCIN HYDROCHLORIDE 1750 MG: 1 INJECTION, POWDER, LYOPHILIZED, FOR SOLUTION INTRAVENOUS at 02:23

## 2019-01-01 RX ADMIN — METOPROLOL TARTRATE 5 MG: 5 INJECTION INTRAVENOUS at 07:42

## 2019-01-01 RX ADMIN — METRONIDAZOLE 500 MG: 500 INJECTION, SOLUTION INTRAVENOUS at 01:03

## 2019-01-01 RX ADMIN — HEPARIN SODIUM 5000 UNITS: 5000 INJECTION, SOLUTION INTRAVENOUS; SUBCUTANEOUS at 09:04

## 2019-01-01 RX ADMIN — METRONIDAZOLE 500 MG: 500 INJECTION, SOLUTION INTRAVENOUS at 14:10

## 2019-01-01 RX ADMIN — TAZOBACTAM SODIUM AND PIPERACILLIN SODIUM 4.5 G: 500; 4 INJECTION, SOLUTION INTRAVENOUS at 08:45

## 2019-01-01 RX ADMIN — ASPIRIN 81 MG 81 MG: 81 TABLET ORAL at 09:15

## 2019-01-01 RX ADMIN — Medication 10 MEQ: at 13:46

## 2019-01-01 RX ADMIN — Medication 10 MEQ: at 10:39

## 2019-01-01 ASSESSMENT — ACTIVITIES OF DAILY LIVING (ADL)
ADLS_ACUITY_SCORE: 38
ADLS_ACUITY_SCORE: 27
ADLS_ACUITY_SCORE: 40
ADLS_ACUITY_SCORE: 38
ADLS_ACUITY_SCORE: 38
ADLS_ACUITY_SCORE: 40
ADLS_ACUITY_SCORE: 40
ADLS_ACUITY_SCORE: 38
ADLS_ACUITY_SCORE: 40
ADLS_ACUITY_SCORE: 38
ADLS_ACUITY_SCORE: 40
ADLS_ACUITY_SCORE: 40
ADLS_ACUITY_SCORE: 38
ADLS_ACUITY_SCORE: 27
ADLS_ACUITY_SCORE: 40
ADLS_ACUITY_SCORE: 27
ADLS_ACUITY_SCORE: 40
ADLS_ACUITY_SCORE: 27
ADLS_ACUITY_SCORE: 27

## 2019-01-01 ASSESSMENT — ENCOUNTER SYMPTOMS
BACK PAIN: 0
VOMITING: 0
HEADACHES: 0
COUGH: 0
DIARRHEA: 0
ABDOMINAL PAIN: 0
SHORTNESS OF BREATH: 0
WOUND: 0
FATIGUE: 0
CHILLS: 0
FEVER: 0
NAUSEA: 0
CONSTIPATION: 0

## 2019-01-01 ASSESSMENT — MIFFLIN-ST. JEOR
SCORE: 1630
SCORE: 1612.15
SCORE: 1616.69
SCORE: 1619.87
SCORE: 1652
SCORE: 1634
SCORE: 1609.43
SCORE: 1596

## 2019-07-03 NOTE — LETTER
7/3/2019        RE: Sung Osman  [address] [place] [address]        [facility]  PRIMARY CARE PROVIDER AND CLINIC:  Lobito Corbin MD, MD, [address] [facility] / [place] [address]  Chief Complaint   Patient presents with     Hospital F/U     [place] Medical Record Number:  [id number]  Place of Service where encounter took place:  [facility] [[id number]]    Sung Osman  is a 74 year old  (1945), admitted to the above facility from  [facility]. Hospital stay 5/22/19 through 7/3/19..  Admitted to this facility for  rehab, medical management and nursing care.    HPI:    HPI information obtained from: facility chart records, facility staff, patient report and [facility] chart review.   Brief Summary of Hospital Course: Sung Osman has a past medical history of BPH, alcohol abuse with cirrhosis, tobacco use (50 p/y history).  He has had a complicated last several months, and is summarized here:  3/12/19-at routine medical exam and noted to have chest pain.    [facility] 3/12-17/19 -found to have new anterolateral NSTEMI with EF 25% and large R pleural effusion originally concerning for mass/cancer but later determined to be pneumonia. He was treated conservatively with medications and discharged to home.   [facility] 4/19-24/19-with new onset R sided weakness, found to have R frontal ischemic CVA and L periventricular basal ganglia infarct.   [facility] 4/19-5/2/19-with chest pain and resolution of R pleural effusion, cardiology planned for CABG  [facility] 5/9-22/19-CABG x 4 with L LE vein harvest with multiple complications.  He had difficulty being extubated, difficulty awakening after surgery.  Found to have multiple new brain infarcts and R sided weakness.  He needed to be re-intubated, GJ tube placed for nutrition    [place] 5/22-7/3/19-transitioned to [place] for acute care and was successfully extubated and trach decannulated on  6/24/19.  GJ tube in place due to dysphagia and he is not tolerating any oral intake.  Did have some difficulty with urinary retention, noted to have significant phimosis which may need surgical intervention, as well as severe weakness due to prolonged illness. EF now about 55%, found to have R ICA moderate to severe stenosis as well as L ICA 50% stenosis    Updates on Status Since Skilled nursing Admission: Sung reports he is very happy to be closer to home.  Nursing reports needs clarification on several items, just admitted today.       CODE STATUS/ADVANCE DIRECTIVES DISCUSSION:   DNR / DNI  Patient's living condition: lives alone  ALLERGIES: Patient has no known allergies.  PAST MEDICAL HISTORY:  has a past medical history of Intestinal infection due to Clostridium difficile (10/11/2006) and Postoperative urinary retention (5/19/2019).  PAST SURGICAL HISTORY:   has no past surgical history on file.  FAMILY HISTORY: family history includes Alzheimer Disease in his brother; Heart Disease in his father.  SOCIAL HISTORY:   reports that he has been smoking cigarettes.  He has a 45.00 pack-year smoking history. He has never used smokeless tobacco. He reports that he does not drink alcohol or use drugs.    Post Discharge Medication Reconciliation Status: discharge medications reconciled and changed, per note/orders (see AVS)    Current Outpatient Medications   Medication Sig Dispense Refill     acetaminophen (TYLENOL) 325 MG tablet Take 650 mg by mouth every 12 hours as needed       albuterol (PROVENTIL HFA) 108 (90 Base) MCG/ACT inhaler Inhale 1-2 puffs into the lungs every 4 hours as needed       aspirin (ASA) 81 MG chewable tablet 81 mg by Enteral route daily       atorvastatin (LIPITOR) 80 MG tablet 80 mg by Enteral route daily       doxazosin (CARDURA) 2 MG tablet Take 2 mg by mouth At Bedtime       escitalopram (LEXAPRO) 10 MG tablet Take 10 mg by mouth daily       fish oil-omega-3 fatty acids (FISH OIL) 1000  MG capsule Take 2 g by mouth 2 times daily        Furosemide (LASIX) 20 MG tablet Take 1 tablet by mouth every morning. 90 tablet 3     Heparin Sodium, Porcine, (HEPARIN, PORCINE,) 5000 UNIT/ML SOLN injection Inject 5,000 Units Subcutaneous 2 times daily       magnesium oxide (MAG-OX) 400 MG tablet Take 400 mg by mouth daily       melatonin 3 MG tablet 3 mg by Enteral route At Bedtime       metoprolol tartrate (LOPRESSOR) 25 MG tablet 25 mg by Enteral route 2 times daily       mirtazapine (REMERON) 7.5 MG tablet 22.5 mg by Enteral route daily       nystatin (MYCOSTATIN) 097492 UNIT/GM external powder        oxyCODONE (ROXICODONE) 5 MG tablet 5-10 mg by Enteral route every 4 hours as needed       protein modular (PROSOURCE NO CARB) Take 1 packet by mouth daily       tamsulosin (FLOMAX) 0.4 MG 24 hr capsule Take 1 capsule by mouth daily. 90 capsule 3     traZODone (DESYREL) 50 MG tablet 25 mg by Enteral route At Bedtime       ROS:  10 point ROS of systems including Constitutional, Eyes, Respiratory, Cardiovascular, Gastroenterology, Genitourinary, Integumentary, Musculoskeletal, Psychiatric were all negative except for pertinent positives noted in my HPI.    Vitals:  There were no vitals taken for this visit.  Exam:  GENERAL APPEARANCE:  Alert, in no distress, speaks in whisper   HEAD:  Normal, normocephalic, atraumatic  EYE EXAM: normal external eye, conjunctiva, lids, MIGUELINA  CHEST/RESP:  respiratory effort normal, lung sounds CTA , no respiratory distress  CV:  Rate reg, rhythm reg, no murmur, no peripheral edema  M/S:   extremities normal, gait abnormal-weak and unsteady  NEUROLOGIC EXAM: Normal gross motor movement, tone and coordination. No tremor. Cranial nerves 2-12 are normal tested and grossly at patient's baseline  PSYCH:  Alert and oriented to self and family, affect pleasant      Lab/Diagnostic data:  Recent labs in [facility] reviewed by me today.    Creat 1.21  Hemoglobin 11.4    ASSESSMENT/PLAN:  NSTEMI  (non-ST elevated myocardial infarction) (H)  CAD, multiple vessel  Ischemic cardiomyopathy  Patient with new onset NSTEMI on 3/12/19 with subsequent complications.  S/P CABG x 4 on 5/22/19 with vein harvest L LE. No chest pain now, sternal incision is well healed.  On aspirin, lasix, atorvastatin, fish oil, metoprolol and has nitroglycerin prn . plavix discontinued due to concern for bleeding.   -needs cardiology follow up   7/31/19 at [facility] CV Surgery follow up appointment   -cardiac rehab at discharge from TCU  -therapy for strengthening and mobility     History of cerebrovascular accident with residual effects  Weakness due to acute cerebrovascular accident (CVA) (H)  Physical deconditioning  Multiple CVAs as complication after NSTEMI. Now with weakness, significant physical deconditioning.  No obvious sided weakness while lying in bed, per history, does have some R weakness.  On aspirin, Heparin BID .  Lives alone, family in the area. Hopes to return home.   -need to determine length of heparin therapy and next steps after that, ?Warfarin? Though he has no diagnosis of afib and last EKG reviewed in Care Everywhere.   -PT/OT/ST to eval and treat.   -schedule neurology follow up     Severe protein-calorie malnutrition (H)  Oropharyngeal dysphagia  Gastrostomy tube dependent (H)  Feeding by G-tube (H)  NPO now, all medications, foods, fluids per gtube.  On omeprazole for GI protection.    Acute respiratory failure with hypoxia (H)  Pneumonia of right lung due to infectious organism, unspecified part of lung  Tracheostomy care (H)  Successfully weaned off trach and decannulated.  Still has very small opening in neck at site of trach, covered with dressing.  He is able to speak softly.    -monitor trach site  -therapy for strengthening     Hypertrophy of prostate with urinary obstruction  Phimosis  Patient with known past medical history of BPH, and did have some difficulty with urinary retention in the hospital.   Also noted to have significant phimosis and concern that he may need circumcision.  Reported to be voiding adequately now.  On doxazosin AND tamsulosin, records from [place] state tamsulosin caused Gtube to clog and was switched to doxazosin, but this is not reflected in the orders.    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  Most recent creat 1.21 with eGFR 58  -Avoid nephrotoxic medications  -Renal dosing of medications  -monitor kidney function 7/10/19     Major depressive disorder, single episode, severe (H)  On lexapro, mirtazapine, trazodone and melatonin for sleep.  These were all started at [facility] and will need monitoring.    -consider simplification of medications       transcribed by : Mare Lance  1. Trach site dressing Q 3 days and prn until closed- cleanse gently and dry thoroughly, apply mepilex to seal.  2. Weights 3 x week Dx Gtube feeding.    Total time spent with patient visit at the skilled nursing facility was 44 minutes including patient visit, review of past records and phone call to patient contact. Greater than 50% of total time spent with counseling and coordinating care due to complexity of care     Electronically signed by:  SHAUN Steward CNP                         Sincerely,        SHAUN Steward CNP

## 2019-07-03 NOTE — PROGRESS NOTES
[facility]  PRIMARY CARE PROVIDER AND CLINIC:  Lobito Corbin MD, MD, [address] [facility] / [facility] [address]  Chief Complaint   Patient presents with     Hospital F/U     [facility] Medical Record Number:  [id number]  Place of Service where encounter took place:  [facility] ([facility] [[id number]]    Sung Osman  is a 74 year old  (1945), admitted to the above facility from  [facility]. Hospital stay 5/22/19 through 7/3/19..  Admitted to this facility for  rehab, medical management and nursing care.    HPI:    HPI information obtained from: facility chart records, facility staff, patient report and [facility] chart review.   Brief Summary of Hospital Course: Sung Osman has a past medical history of BPH, alcohol abuse with cirrhosis, tobacco use (50 p/y history).  He has had a complicated last several months, and is summarized here:  3/12/19-at routine medical exam and noted to have chest pain.    [facility] 3/12-17/19 -found to have new anterolateral NSTEMI with EF 25% and large R pleural effusion originally concerning for mass/cancer but later determined to be pneumonia. He was treated conservatively with medications and discharged to home.   [facility] 4/19-24/19-with new onset R sided weakness, found to have R frontal ischemic CVA and L periventricular basal ganglia infarct.   [facility] 4/19-5/2/19-with chest pain and resolution of R pleural effusion, cardiology planned for CABG  [facility] 5/9-22/19-CABG x 4 with L LE vein harvest with multiple complications.  He had difficulty being extubated, difficulty awakening after surgery.  Found to have multiple new brain infarcts and R sided weakness.  He needed to be re-intubated, GJ tube placed for nutrition    [place] 5/22-7/3/19-transitioned to [place] for acute care and was successfully extubated and trach decannulated on 6/24/19.  GJ tube in place due to dysphagia and he is not tolerating any oral intake.  Did have  some difficulty with urinary retention, noted to have significant phimosis which may need surgical intervention, as well as severe weakness due to prolonged illness. EF now about 55%, found to have R ICA moderate to severe stenosis as well as L ICA 50% stenosis    Updates on Status Since Skilled nursing Admission: Sung reports he is very happy to be closer to home.  Nursing reports needs clarification on several items, just admitted today.       CODE STATUS/ADVANCE DIRECTIVES DISCUSSION:   DNR / DNI  Patient's living condition: lives alone  ALLERGIES: Patient has no known allergies.  PAST MEDICAL HISTORY:  has a past medical history of Intestinal infection due to Clostridium difficile (10/11/2006) and Postoperative urinary retention (5/19/2019).  PAST SURGICAL HISTORY:   has no past surgical history on file.  FAMILY HISTORY: family history includes Alzheimer Disease in his brother; Heart Disease in his father.  SOCIAL HISTORY:   reports that he has been smoking cigarettes.  He has a 45.00 pack-year smoking history. He has never used smokeless tobacco. He reports that he does not drink alcohol or use drugs.    Post Discharge Medication Reconciliation Status: discharge medications reconciled and changed, per note/orders (see AVS)    Current Outpatient Medications   Medication Sig Dispense Refill     acetaminophen (TYLENOL) 325 MG tablet Take 650 mg by mouth every 12 hours as needed       albuterol (PROVENTIL HFA) 108 (90 Base) MCG/ACT inhaler Inhale 1-2 puffs into the lungs every 4 hours as needed       aspirin (ASA) 81 MG chewable tablet 81 mg by Enteral route daily       atorvastatin (LIPITOR) 80 MG tablet 80 mg by Enteral route daily       doxazosin (CARDURA) 2 MG tablet Take 2 mg by mouth At Bedtime       escitalopram (LEXAPRO) 10 MG tablet Take 10 mg by mouth daily       fish oil-omega-3 fatty acids (FISH OIL) 1000 MG capsule Take 2 g by mouth 2 times daily        Furosemide (LASIX) 20 MG tablet Take 1 tablet  by mouth every morning. 90 tablet 3     Heparin Sodium, Porcine, (HEPARIN, PORCINE,) 5000 UNIT/ML SOLN injection Inject 5,000 Units Subcutaneous 2 times daily       magnesium oxide (MAG-OX) 400 MG tablet Take 400 mg by mouth daily       melatonin 3 MG tablet 3 mg by Enteral route At Bedtime       metoprolol tartrate (LOPRESSOR) 25 MG tablet 25 mg by Enteral route 2 times daily       mirtazapine (REMERON) 7.5 MG tablet 22.5 mg by Enteral route daily       nystatin (MYCOSTATIN) 447616 UNIT/GM external powder        oxyCODONE (ROXICODONE) 5 MG tablet 5-10 mg by Enteral route every 4 hours as needed       protein modular (PROSOURCE NO CARB) Take 1 packet by mouth daily       tamsulosin (FLOMAX) 0.4 MG 24 hr capsule Take 1 capsule by mouth daily. 90 capsule 3     traZODone (DESYREL) 50 MG tablet 25 mg by Enteral route At Bedtime       ROS:  10 point ROS of systems including Constitutional, Eyes, Respiratory, Cardiovascular, Gastroenterology, Genitourinary, Integumentary, Musculoskeletal, Psychiatric were all negative except for pertinent positives noted in my HPI.    Vitals:  There were no vitals taken for this visit.  Exam:  GENERAL APPEARANCE:  Alert, in no distress, speaks in whisper   HEAD:  Normal, normocephalic, atraumatic  EYE EXAM: normal external eye, conjunctiva, lids, MIGUELINA  CHEST/RESP:  respiratory effort normal, lung sounds CTA , no respiratory distress  CV:  Rate reg, rhythm reg, no murmur, no peripheral edema  M/S:   extremities normal, gait abnormal-weak and unsteady  NEUROLOGIC EXAM: Normal gross motor movement, tone and coordination. No tremor. Cranial nerves 2-12 are normal tested and grossly at patient's baseline  PSYCH:  Alert and oriented to self and family, affect pleasant      Lab/Diagnostic data:  Recent labs in [facility] reviewed by me today.    Creat 1.21  Hemoglobin 11.4    ASSESSMENT/PLAN:  NSTEMI (non-ST elevated myocardial infarction) (H)  CAD, multiple vessel  Ischemic cardiomyopathy  Patient  with new onset NSTEMI on 3/12/19 with subsequent complications.  S/P CABG x 4 on 5/22/19 with vein harvest L LE. No chest pain now, sternal incision is well healed.  On aspirin, lasix, atorvastatin, fish oil, metoprolol and has nitroglycerin prn . plavix discontinued due to concern for bleeding.   -needs cardiology follow up   7/31/19 at [facility] CV Surgery follow up appointment   -cardiac rehab at discharge from TCU  -therapy for strengthening and mobility     History of cerebrovascular accident with residual effects  Weakness due to acute cerebrovascular accident (CVA) (H)  Physical deconditioning  Multiple CVAs as complication after NSTEMI. Now with weakness, significant physical deconditioning.  No obvious sided weakness while lying in bed, per history, does have some R weakness.  On aspirin, Heparin BID .  Lives alone, family in the area. Hopes to return home.   -need to determine length of heparin therapy and next steps after that, ?Warfarin? Though he has no diagnosis of afib and last EKG reviewed in Care Everywhere.   -PT/OT/ST to eval and treat.   -schedule neurology follow up     Severe protein-calorie malnutrition (H)  Oropharyngeal dysphagia  Gastrostomy tube dependent (H)  Feeding by G-tube (H)  NPO now, all medications, foods, fluids per gtube.  On omeprazole for GI protection.    Acute respiratory failure with hypoxia (H)  Pneumonia of right lung due to infectious organism, unspecified part of lung  Tracheostomy care (H)  Successfully weaned off trach and decannulated.  Still has very small opening in neck at site of trach, covered with dressing.  He is able to speak softly.    -monitor trach site  -therapy for strengthening     Hypertrophy of prostate with urinary obstruction  Phimosis  Patient with known past medical history of BPH, and did have some difficulty with urinary retention in the hospital.  Also noted to have significant phimosis and concern that he may need circumcision.  Reported to be  voiding adequately now.  On doxazosin AND tamsulosin, records from [place] state tamsulosin caused Gtube to clog and was switched to doxazosin, but this is not reflected in the orders.    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  Most recent creat 1.21 with eGFR 58  -Avoid nephrotoxic medications  -Renal dosing of medications  -monitor kidney function 7/10/19     Major depressive disorder, single episode, severe (H)  On lexapro, mirtazapine, trazodone and melatonin for sleep.  These were all started at [place] and will need monitoring.    -consider simplification of medications       transcribed by : Mare Lance  1. Trach site dressing Q 3 days and prn until closed- cleanse gently and dry thoroughly, apply mepilex to seal.  2. Weights 3 x week Dx Gtube feeding.    Total time spent with patient visit at the skilled nursing facility was 44 minutes including patient visit, review of past records and phone call to patient contact. Greater than 50% of total time spent with counseling and coordinating care due to complexity of care     Electronically signed by:  SHAUN Steward CNP

## 2019-07-04 PROBLEM — R53.1: Status: ACTIVE | Noted: 2019-01-01

## 2019-07-04 PROBLEM — N99.89 POSTOPERATIVE URINARY RETENTION: Status: ACTIVE | Noted: 2019-01-01

## 2019-07-04 PROBLEM — R13.12 OROPHARYNGEAL DYSPHAGIA: Status: ACTIVE | Noted: 2019-01-01

## 2019-07-04 PROBLEM — I21.4 NSTEMI (NON-ST ELEVATED MYOCARDIAL INFARCTION) (H): Status: ACTIVE | Noted: 2019-01-01

## 2019-07-04 PROBLEM — K70.30 ALCOHOLIC CIRRHOSIS (H): Status: ACTIVE | Noted: 2019-01-01

## 2019-07-04 PROBLEM — Z43.0 TRACHEOSTOMY CARE (H): Status: ACTIVE | Noted: 2019-01-01

## 2019-07-04 PROBLEM — I25.5 ISCHEMIC CARDIOMYOPATHY: Status: ACTIVE | Noted: 2019-01-01

## 2019-07-04 PROBLEM — F32.2 MAJOR DEPRESSIVE DISORDER, SINGLE EPISODE, SEVERE (H): Status: ACTIVE | Noted: 2019-01-01

## 2019-07-04 PROBLEM — J96.01 ACUTE RESPIRATORY FAILURE WITH HYPOXIA (H): Status: ACTIVE | Noted: 2019-01-01

## 2019-07-04 PROBLEM — N99.89 POSTOPERATIVE URINARY RETENTION: Status: RESOLVED | Noted: 2019-01-01 | Resolved: 2019-01-01

## 2019-07-04 PROBLEM — I25.10 CAD, MULTIPLE VESSEL: Status: ACTIVE | Noted: 2019-01-01

## 2019-07-04 PROBLEM — Z93.1 FEEDING BY G-TUBE (H): Status: ACTIVE | Noted: 2019-01-01

## 2019-07-04 PROBLEM — Z93.1 GASTROSTOMY TUBE DEPENDENT (H): Status: ACTIVE | Noted: 2019-01-01

## 2019-07-04 PROBLEM — E43 SEVERE PROTEIN-CALORIE MALNUTRITION (H): Status: ACTIVE | Noted: 2019-01-01

## 2019-07-04 PROBLEM — I63.9: Status: ACTIVE | Noted: 2019-01-01

## 2019-07-04 PROBLEM — N18.2 CKD (CHRONIC KIDNEY DISEASE) STAGE 2, GFR 60-89 ML/MIN: Status: ACTIVE | Noted: 2019-01-01

## 2019-07-04 PROBLEM — R33.8 POSTOPERATIVE URINARY RETENTION: Status: ACTIVE | Noted: 2019-01-01

## 2019-07-04 PROBLEM — M62.50 MUSCULAR WASTING AND DISUSE ATROPHY: Status: ACTIVE | Noted: 2019-01-01

## 2019-07-04 PROBLEM — R53.81 PHYSICAL DECONDITIONING: Status: ACTIVE | Noted: 2019-01-01

## 2019-07-04 PROBLEM — R33.8 POSTOPERATIVE URINARY RETENTION: Status: RESOLVED | Noted: 2019-01-01 | Resolved: 2019-01-01

## 2019-07-04 PROBLEM — G93.40 ACUTE ENCEPHALOPATHY: Status: ACTIVE | Noted: 2019-01-01

## 2019-07-04 PROBLEM — I69.90 HISTORY OF CEREBROVASCULAR ACCIDENT WITH RESIDUAL EFFECTS: Status: ACTIVE | Noted: 2019-01-01

## 2019-07-04 NOTE — ED NOTES
Pt here for fall today at [facility], states he was getting out of bed to bedside chair, no LOC did hit his head. No complaints of pain or problems.

## 2019-07-04 NOTE — DISCHARGE INSTRUCTIONS
Return to the Emergency Room if the following occurs:     New severe headache, concerning changes in behavior, progressively worsened pain, or for any concern at anytime.    Or, follow-up with the following provider as we discussed:     Return to your primary doctor as needed.    Medications discussed:    None new.  No changes.    If you received pain-relieving or sedating medication during your time in the ER, avoid alcohol, driving automobiles, or working with machinery.  Also, a responsible adult must stay with you.        Call the Nurse Advice Line at [phone] or [phone] for any concern at anytime.

## 2019-07-04 NOTE — ED AVS SNAPSHOT
[facility] Emergency Department  [address] [address]  Phone:  [phone]  Fax:  [phone]                                    Sung Osman   MRN: [id number]    Department:  [facility] Emergency Department   Date of Visit:  7/4/2019           After Visit Summary Signature Page    I have received my discharge instructions, and my questions have been answered. I have discussed any challenges I see with this plan with the nurse or doctor.    ..........................................................................................................................................  Patient/Patient Representative Signature      ..........................................................................................................................................  Patient Representative Print Name and Relationship to Patient    ..................................................               ................................................  Date                                   Time    ..........................................................................................................................................  Reviewed by Signature/Title    ...................................................              ..............................................  Date                                               Time          22EPIC Rev 08/18

## 2019-07-04 NOTE — ED PROVIDER NOTES
HPI  Current medications, past medical history, and social history are reviewed.    The patient is a 74-year-old male presenting from his TCU by EMS transport after he was found down on the ground.  The patient tells me that he was getting up from his bed and going to a chair when he slipped and fell to the left side.  He denies losing consciousness.  He does report hitting his head.  He denies having a headache currently.  He denies having neck pain currently.  He had some soreness in his left shoulder but this has improved.  He denies chest pain, abdominal pain, pelvic pain.  He denies back pain and lower extremity pain.  He denies any recent illness or fever.  He denies feeling lightheaded prior to his fall.  Also of note, the patient was found to have an obstructed G-tube just prior to his fall.    ROS: All other review of systems are negative other than that noted above.     Past Medical History:   Diagnosis Date     Intestinal infection due to Clostridium difficile 10/11/2006    Diarrhea 10/06, 1/07     Postoperative urinary retention 5/19/2019     History reviewed. No pertinent surgical history.  Medicines    acetaminophen (TYLENOL) 325 MG tablet   albuterol (PROVENTIL HFA) 108 (90 Base) MCG/ACT inhaler   aspirin (ASA) 81 MG chewable tablet   atorvastatin (LIPITOR) 80 MG tablet   doxazosin (CARDURA) 2 MG tablet   escitalopram (LEXAPRO) 10 MG tablet   fish oil-omega-3 fatty acids (FISH OIL) 1000 MG capsule   Furosemide (LASIX) 20 MG tablet   Heparin Sodium, Porcine, (HEPARIN, PORCINE,) 5000 UNIT/ML SOLN injection   magnesium oxide (MAG-OX) 400 MG tablet   melatonin 3 MG tablet   metoprolol tartrate (LOPRESSOR) 25 MG tablet   mirtazapine (REMERON) 7.5 MG tablet   nitroGLYcerin (NITROSTAT) 0.4 MG sublingual tablet   nystatin (MYCOSTATIN) 018643 UNIT/GM external powder   omeprazole (PRILOSEC OTC) 20 MG EC tablet   oxyCODONE (ROXICODONE) 5 MG tablet   protein modular (PROSOURCE NO CARB)   tamsulosin (FLOMAX) 0.4  MG 24 hr capsule   traZODone (DESYREL) 50 MG tablet     Family History   Problem Relation Age of Onset     Heart Disease Father      Alzheimer Disease Brother         dementia     Social History     Tobacco Use     Smoking status: Current Every Day Smoker     Packs/day: 1.00     Years: 45.00     Pack years: 45.00     Types: Cigarettes     Smokeless tobacco: Never Used   Substance Use Topics     Alcohol use: No     Drug use: No         PHYSICAL  /83   Temp 97.7  F (36.5  C) (Oral)   Resp 14   SpO2 96%   General: Patient is alert and in no distress.  Sitting up and looking at us, cooperative, answers questions when asked.  Neurological: Alert.  He has weakness when trying to lift his arms away from his body, left greater than right.  He tells me this is typical for him and is his baseline.  His lower extremity strength is normal, 5/5.  Head / Neck: Atraumatic.  No midline cervical tenderness.  He has left anterior neck discomfort when turning his head to the left into the right.  There is no bruising or swelling in this area.  There is no tenderness.  Ears: Not done.  Eyes: Pupils are equal, round, and reactive.  Normal conjunctiva.  Nose: Midline.  No epistaxis.  Mouth / Throat: No ulcerations or lesions.  Upper pharynx is not erythematous.  Moist.  Respiratory: No respiratory distress. CTA B.  Cardiovascular: Regular rhythm.  Peripheral extremities are warm.  No edema.  No calf tenderness.  Abdomen / Pelvis: Gastric tube in place.  No skin rash around this site.  Not tender.  No distention.  Soft throughout.  Genitalia: Not done.  Musculoskeletal: No tenderness over major muscles and joints.  Skin: I visualized his tracheostomy site.  This appears to be healing well.  No redness or evidence for cellulitis no purulent drainage.      ED COURSE  1347.  The patient fell when getting up from his bed to chair.  He has difficulty telling me exactly what happened or more specifically how it happened.  He thinks  that he slept.  He fell to the ground and hit his left forehead.  He denies loss of consciousness.  No headache or nausea.  He is answering questions appropriately.  His weakness is apparently at his baseline.  He is on heparin.    1354.  I just spoke with the nurse at the TCU where the patient came from.  He arrived yesterday after being in long-term care at [facility].  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  Postoperative course was prolonged and complicated.  On postop day #1 the patient opened his eyes but did not respond to commands and did not move his right upper extremity.  CT scan at that time showed multifocal subacute bilateral cerebellum, occipital, parietal, and frontal lobe infarcts.  He had a tracheostomy tube placed on 5/17.  He had a GJ tube placed on 5/15.  He was transferred to [facility] on 5/22.  He was weaned from the trach and decannulated on 6/24.  The nursing staff at his TCU were attempting to provide medication into his G-tube when they recognize that it was plugged.  As they were gathering material to try to unplug the tube they came back to the room and recognized that he was on the ground and had fallen.  The fall was not witnessed but is described above by the patient.  They sent him here for evaluation after the fall and to consider the G-tube obstruction.  The patient's reliability is unknown.  He cannot remember the details of his fall.  A CT scan will be obtained to clarify any internal injury involving his head.    1436.  CT scans unremarkable.  The G-tube was not obstructed.  Patient will be discharged back to his TCU.  No further work-up at this time.  Follow-up discussed.    Labs Ordered and Resulted from Time of ED Arrival Up to the Time of Departure from the ED - No data to display    IMAGING  Images reviewed by me.  Radiology report also reviewed.  CT Head w/o Contrast   Final Result   IMPRESSION:    1. No intracranial bleed or skull fractures are identified.    2. Multiple chronic appearing infarcts including infarcts in the right   and left cerebellum, the left occipital lobe, the right parietal lobe,   the right basal ganglia, and the left corona radiata.      3. There is diffuse parenchymal volume loss.  White matter changes are   present in the cerebral hemispheres that are consistent with small   vessel ischemic disease in this age patient.         ALEXANDRO GALAN MD          Medications - No data to display      IMPRESSION    ICD-10-CM    1. Fall, initial encounter W19.XXXA                                  David Mustafa MD  07/04/19 3348

## 2019-07-04 NOTE — ED NOTES
Bed: ED04  Expected date:   Expected time:   Means of arrival: Ambulance  Comments:  EMS Sung Osman

## 2019-07-04 NOTE — ED NOTES
J-tube flushes and aspirates without problem. G-tube did not initially flush but this writer was able to aspirate a clot out and not flushes w/out issue.

## 2019-07-08 NOTE — TELEPHONE ENCOUNTER
Staff called noting clogged GT    Orders  -Okay to administer coke as it is available in-house. Instill for 20min x1, attempt flush. If unsuccessful, re-instill for 40min and attempt flush.   -Update team if remains clogged for NaHCo3 + Viokase order    Dr. Ashley Vasques, APRN, [facility]  Phone: [phone]  Fax: [phone]

## 2019-07-08 NOTE — PROGRESS NOTES
[facility]  [facility] Medical Record Number:  [id number]  Place of Service where encounter took place:  [facility] ([facility]) [[id number]]  Chief Complaint   Patient presents with     RECHECK       HPI:    Sung Osman  is a 74 year old (1945), who is being seen today for an episodic care visit.  HPI information obtained from: facility chart records, facility staff, patient report and [facility] chart review. Today's concern is:     NSTEMI (non-ST elevated myocardial infarction) (H)  Weakness due to acute cerebrovascular accident (CVA) (H)  Phimosis  Hypertrophy of prostate with urinary obstruction  Urinary tract infection without hematuria, site unspecified   Sung is confused, disheveled and unable to answer simple questions.  His eyes are red and he has complaints of itching.  Nursing reports no new concerns.       Past Medical and Surgical History reviewed in Epic today.    MEDICATIONS:  Current Outpatient Medications   Medication Sig Dispense Refill     acetaminophen (TYLENOL) 325 MG tablet Take 650 mg by mouth every 12 hours as needed       albuterol (PROVENTIL HFA) 108 (90 Base) MCG/ACT inhaler Inhale 1-2 puffs into the lungs every 4 hours as needed       aspirin (ASA) 81 MG chewable tablet 81 mg by Enteral route daily       atorvastatin (LIPITOR) 80 MG tablet 80 mg by Enteral route daily       doxazosin (CARDURA) 2 MG tablet Take 2 mg by mouth At Bedtime       escitalopram (LEXAPRO) 10 MG tablet Take 10 mg by mouth daily       fish oil-omega-3 fatty acids (FISH OIL) 1000 MG capsule Take 2 g by mouth 2 times daily        Furosemide (LASIX) 20 MG tablet Take 1 tablet by mouth every morning. 90 tablet 3     Heparin Sodium, Porcine, (HEPARIN, PORCINE,) 5000 UNIT/ML SOLN injection Inject 5,000 Units Subcutaneous 2 times daily       magnesium oxide (MAG-OX) 400 MG tablet Take 400 mg by mouth daily       melatonin 3 MG tablet 3 mg by Enteral route At Bedtime       metoprolol  tartrate (LOPRESSOR) 25 MG tablet 25 mg by Enteral route 2 times daily       mirtazapine (REMERON) 7.5 MG tablet 22.5 mg by Enteral route daily       nitroGLYcerin (NITROSTAT) 0.4 MG sublingual tablet For chest pain place 1 tablet under the tongue every 5 minutes for 3 doses. If symptoms persist 5 minutes after 1st dose call 911.       nystatin (MYCOSTATIN) 756662 UNIT/GM external powder        omeprazole (PRILOSEC OTC) 20 MG EC tablet Take 1 tablet (20 mg) by mouth daily       oxyCODONE (ROXICODONE) 5 MG tablet 5-10 mg by Enteral route every 4 hours as needed       protein modular (PROSOURCE NO CARB) Take 1 packet by mouth daily       tamsulosin (FLOMAX) 0.4 MG 24 hr capsule Take 1 capsule by mouth daily. 90 capsule 3     traZODone (DESYREL) 50 MG tablet 25 mg by Enteral route At Bedtime       REVIEW OF SYSTEMS:  Unobtainable secondary to cognitive impairment.     Objective:  /81   Pulse 97   Temp 98.2  F (36.8  C)   Resp 18   Wt 83.7 kg (184 lb 8 oz)   SpO2 95%   BMI 26.66 kg/m    Exam:  GENERAL APPEARANCE:  Alert, in no distress   HEAD:  Normal, normocephalic, atraumatic  EYE EXAM: normal external eye, conjunctiva, lids, MIGUELINA  CHEST/RESP:  respiratory effort normal, lung sounds CTA , no respiratory distress  CV:  Rate reg, rhythm reg, no murmur, no peripheral edema  M/S:   extremities abnormal, gait abnormal-unable to ambulate and   NEUROLOGIC EXAM: CN normal, no obvious sided weakness   PSYCH:  Alert and unable to determine orientation due to cognitive impairment      Labs:   Most Recent Urinalysis:  Recent Labs   Lab Test 07/08/19  1100   COLOR Yellow   APPEARANCE Slightly Cloudy   URINEGLC Negative   URINEBILI Negative   URINEKETONE Negative   SG 1.012   UBLD Negative   URINEPH 9.0*   PROTEIN 100*   NITRITE Negative   LEUKEST Large*   RBCU 19*   WBCU 8*       ASSESSMENT/PLAN:  NSTEMI (non-ST elevated myocardial infarction) (H)  Patient S/P NSTEMI, on metoprolol, statin, lasix. No obvious new  symptoms, no reports of chest pain.     Weakness due to acute cerebrovascular accident (CVA) (H)  Restless and weakness, ongoing.  Using a low bed to prevent falling.  Very confused.    -continue therapy    Phimosis  Hypertrophy of prostate with urinary obstruction  Urinary tract infection without hematuria, site unspecified  Patient with known history of phimosis and nursing notes that his foreskin opening appears to be getting smaller.  A strait cath done today was difficult. UA specimen looks dirty, will be starting on antibiotics today.      I placed a phone call to daughter, discussed concerns with phimosis.  She will discuss with her brothers and let me know the outcome of their discussion.  He would likely benefit from urology consult, and they will consider this.  Would prefer [facility]     Dry eyes  Very dry, crusty eyes, complaints of itching.  Will order eye gtts for comfort.     transcribed by : Megan Sarmiento  Orders:  1. Discontinue Tamsulosin-on doxazosin  2.  cipro 250 BID x 5 days for UTI  3.  Artificial tears 2 gtts QID both eyes for dry eyes      Electronically signed by:  SHAUN Steward CNP

## 2019-07-08 NOTE — LETTER
7/8/2019        RE: Sung Osman  [address] [place] [address]        [facility]  [facility] Record Number:  [id number]  Place of Service where encounter took place:  [facility] ([facility]) [[id number]]  Chief Complaint   Patient presents with     RECHECK       HPI:    Sung Osman  is a 74 year old (1945), who is being seen today for an episodic care visit.  HPI information obtained from: facility chart records, facility staff, patient report and [facility] chart review. Today's concern is:     NSTEMI (non-ST elevated myocardial infarction) (H)  Weakness due to acute cerebrovascular accident (CVA) (H)  Phimosis  Hypertrophy of prostate with urinary obstruction  Urinary tract infection without hematuria, site unspecified   Sung is confused, disheveled and unable to answer simple questions.  His eyes are red and he has complaints of itching.  Nursing reports no new concerns.       Past Medical and Surgical History reviewed in Epic today.    MEDICATIONS:  Current Outpatient Medications   Medication Sig Dispense Refill     acetaminophen (TYLENOL) 325 MG tablet Take 650 mg by mouth every 12 hours as needed       albuterol (PROVENTIL HFA) 108 (90 Base) MCG/ACT inhaler Inhale 1-2 puffs into the lungs every 4 hours as needed       aspirin (ASA) 81 MG chewable tablet 81 mg by Enteral route daily       atorvastatin (LIPITOR) 80 MG tablet 80 mg by Enteral route daily       doxazosin (CARDURA) 2 MG tablet Take 2 mg by mouth At Bedtime       escitalopram (LEXAPRO) 10 MG tablet Take 10 mg by mouth daily       fish oil-omega-3 fatty acids (FISH OIL) 1000 MG capsule Take 2 g by mouth 2 times daily        Furosemide (LASIX) 20 MG tablet Take 1 tablet by mouth every morning. 90 tablet 3     Heparin Sodium, Porcine, (HEPARIN, PORCINE,) 5000 UNIT/ML SOLN injection Inject 5,000 Units Subcutaneous 2 times daily       magnesium oxide (MAG-OX) 400 MG tablet Take 400 mg  by mouth daily       melatonin 3 MG tablet 3 mg by Enteral route At Bedtime       metoprolol tartrate (LOPRESSOR) 25 MG tablet 25 mg by Enteral route 2 times daily       mirtazapine (REMERON) 7.5 MG tablet 22.5 mg by Enteral route daily       nitroGLYcerin (NITROSTAT) 0.4 MG sublingual tablet For chest pain place 1 tablet under the tongue every 5 minutes for 3 doses. If symptoms persist 5 minutes after 1st dose call 911.       nystatin (MYCOSTATIN) 362703 UNIT/GM external powder        omeprazole (PRILOSEC OTC) 20 MG EC tablet Take 1 tablet (20 mg) by mouth daily       oxyCODONE (ROXICODONE) 5 MG tablet 5-10 mg by Enteral route every 4 hours as needed       protein modular (PROSOURCE NO CARB) Take 1 packet by mouth daily       tamsulosin (FLOMAX) 0.4 MG 24 hr capsule Take 1 capsule by mouth daily. 90 capsule 3     traZODone (DESYREL) 50 MG tablet 25 mg by Enteral route At Bedtime       REVIEW OF SYSTEMS:  Unobtainable secondary to cognitive impairment.     Objective:  /81   Pulse 97   Temp 98.2  F (36.8  C)   Resp 18   Wt 83.7 kg (184 lb 8 oz)   SpO2 95%   BMI 26.66 kg/m     Exam:  GENERAL APPEARANCE:  Alert, in no distress   HEAD:  Normal, normocephalic, atraumatic  EYE EXAM: normal external eye, conjunctiva, lids, MIGUELINA  CHEST/RESP:  respiratory effort normal, lung sounds CTA , no respiratory distress  CV:  Rate reg, rhythm reg, no murmur, no peripheral edema  M/S:   extremities abnormal, gait abnormal-unable to ambulate and   NEUROLOGIC EXAM: CN normal, no obvious sided weakness   PSYCH:  Alert and unable to determine orientation due to cognitive impairment      Labs:   Most Recent Urinalysis:  Recent Labs   Lab Test 07/08/19  1100   COLOR Yellow   APPEARANCE Slightly Cloudy   URINEGLC Negative   URINEBILI Negative   URINEKETONE Negative   SG 1.012   UBLD Negative   URINEPH 9.0*   PROTEIN 100*   NITRITE Negative   LEUKEST Large*   RBCU 19*   WBCU 8*       ASSESSMENT/PLAN:  NSTEMI (non-ST elevated  myocardial infarction) (H)  Patient S/P NSTEMI, on metoprolol, statin, lasix. No obvious new symptoms, no reports of chest pain.     Weakness due to acute cerebrovascular accident (CVA) (H)  Restless and weakness, ongoing.  Using a low bed to prevent falling.  Very confused.    -continue therapy    Phimosis  Hypertrophy of prostate with urinary obstruction  Urinary tract infection without hematuria, site unspecified  Patient with known history of phimosis and nursing notes that his foreskin opening appears to be getting smaller.  A strait cath done today was difficult. UA specimen looks dirty, will be starting on antibiotics today.      I placed a phone call to daughter, discussed concerns with phimosis.  She will discuss with her brothers and let me know the outcome of their discussion.  He would likely benefit from urology consult, and they will consider this.  Would prefer [facility]     Dry eyes  Very dry, crusty eyes, complaints of itching.  Will order eye gtts for comfort.     transcribed by : Megan Sarmiento  Orders:  1. Discontinue Tamsulosin-on doxazosin  2.  cipro 250 BID x 5 days for UTI  3.  Artificial tears 2 gtts QID both eyes for dry eyes      Electronically signed by:  SHAUN Steward CNP             Sincerely,        SHAUN Steward CNP

## 2019-07-08 NOTE — TELEPHONE ENCOUNTER
llPatient with fall onto left should with c/o new pain. Does have PRN oxycodone for pain.    Orders:  -STAT 2-view Xray of left shoulder    SHAUN Light, [facility]  Phone: [phone]  Fax: [phone]

## 2019-07-09 PROBLEM — N47.1 PHIMOSIS: Status: ACTIVE | Noted: 2019-01-01

## 2019-07-10 NOTE — PROGRESS NOTES
[facility]  [facility] Medical Record Number:  [id number]  Place of Service where encounter took place:  [facility] [place] ([facility]) [[id number]]  Chief Complaint   Patient presents with     Nursing Home Acute       HPI:    Sung Osman  is a 74 year old (1945), who is being seen today for an episodic care visit.  HPI information obtained from: facility chart records, facility staff and patient report. Today's concern is:     Urinary retention  Urinary tract infection without hematuria, site unspecified  Phimosis  Gastrostomy tube dependent (H)  Severe protein-calorie malnutrition (H)  Oropharyngeal dysphagia  Weakness due to acute cerebrovascular accident (CVA) (H)   Sung is unable to communicate needs effectively.  Nursing reports Gtube is not functional, cannot instill medications. Also notes abdominal fullness, pain, and minimal urination. Nursing strait cathed for 550 ml, PVR bladder scan showed 650 ml in bladder.       Past Medical and Surgical History reviewed in Epic today.    MEDICATIONS:  Current Outpatient Medications   Medication Sig Dispense Refill     acetaminophen (TYLENOL) 325 MG tablet Take 650 mg by mouth every 12 hours as needed       albuterol (PROVENTIL HFA) 108 (90 Base) MCG/ACT inhaler Inhale 1-2 puffs into the lungs every 4 hours as needed       aspirin (ASA) 81 MG chewable tablet 81 mg by Enteral route daily       atorvastatin (LIPITOR) 80 MG tablet 80 mg by Enteral route daily       bisacodyl (DULCOLAX) 10 MG suppository Place 10 mg rectally daily as needed for constipation       ciprofloxacin (CIPRO) 250 MG/5ML (5%) suspension Take by mouth 2 times daily Give 5 ml via G-Tube two times a day for UTI for  5 Days update NP with sensitivity results       doxazosin (CARDURA) 2 MG tablet Take 2 mg by mouth At Bedtime       escitalopram (LEXAPRO) 10 MG tablet Take 10 mg by mouth daily       fish oil-omega-3 fatty acids (FISH OIL) 1000 MG capsule Take 2 g by mouth 2  times daily        Furosemide (LASIX) 20 MG tablet Take 1 tablet by mouth every morning. 90 tablet 3     Heparin Sodium, Porcine, (HEPARIN, PORCINE,) 5000 UNIT/ML SOLN injection Inject 5,000 Units Subcutaneous 2 times daily       magnesium oxide (MAG-OX) 400 MG tablet Take 400 mg by mouth daily       melatonin 3 MG tablet 3 mg by Enteral route At Bedtime       metoprolol tartrate (LOPRESSOR) 25 MG tablet 25 mg by Enteral route 2 times daily       miconazole (MICATIN) 2 % AERP powder Apply topically 2 times daily       mirtazapine (REMERON) 7.5 MG tablet 22.5 mg by Enteral route daily       nitroGLYcerin (NITROSTAT) 0.4 MG sublingual tablet For chest pain place 1 tablet under the tongue every 5 minutes for 3 doses. If symptoms persist 5 minutes after 1st dose call 911.       omeprazole (PRILOSEC OTC) 20 MG EC tablet Take 1 tablet (20 mg) by mouth daily       oxyCODONE (ROXICODONE) 5 MG tablet 5-10 mg by Enteral route every 4 hours as needed       protein modular (PROSOURCE NO CARB) Take 1 packet by mouth daily       traZODone (DESYREL) 50 MG tablet 25 mg by Enteral route At Bedtime       nystatin (MYCOSTATIN) 918812 UNIT/GM external powder        REVIEW OF SYSTEMS:  Unobtainable secondary to aphasia.     Objective:  /77   Pulse 89   Temp 99.8  F (37.7  C)   Resp 24   Ht 1.829 m (6')   Wt 84.4 kg (186 lb)   SpO2 95%   BMI 25.23 kg/m    Exam:  GENERAL APPEARANCE:  Alert, in moderate distress  ENT:  Mouth and posterior oropharynx normal, moist mucous membranes, hearing acuity within normal limits   EYES:  EOM, conjunctivae, lids, pupils and irises normal  RESP:  respiratory effort normal, no respiratory distress, Lung sounds CTA  CV:  Auscultation of heart done , rate and rhythm reg, no murmur, no rub or gallop, no edema   ABDOMEN:  Firm, tender in LUQ and pubic region  M/S:   Gait and station abnormal-unable to ambulate, Digits and nails within normal limits   PSYCH:  insight and judgement difficult to  assess as he offers limited conversation     Labs:   Recent labs in [facility] reviewed by me today.     ASSESSMENT/PLAN:  Urinary retention  Urinary tract infection without hematuria, site unspecified  Phimosis  Patient with known UTI and phimosis, now with painful pubic region and 650 ml per bladder scan.  Straight cathed for 550 ml but no small lumen lópez/indwelling catheter in the building and would need to be ordered.     Gastrostomy tube dependent (H)  Severe protein-calorie malnutrition (H)  Oropharyngeal dysphagia  Complete oral dysphagia and now G tube is not patent, unable to flush or check residual.  J tube is patent.        Weakness due to acute cerebrovascular accident (CVA) (H)  Ongoing, with significant weakness requiring total cares and assist with transfers.       Orders written by provider at facility and transcribed by : Jazmyn Ware CMA  1. PVR and start Cath now for >650 ml in bladder.  2.  Hold TF for now (jejimal port)  3.  To ED for evaluation of:   G-tube clogged-cannot flush or pull back.   1. Urinary retension   2. Phimosis-recommend  lópez cath placed.   3.  Clogged G-tube         Electronically signed by:  SHAUN Steward CNP

## 2019-07-11 NOTE — ED AVS SNAPSHOT
[facility] Emergency Department  [address] [address]  Phone:  [phone]  Fax:  [phone]                                    Sung Osman   MRN: [id number]    Department:  [facility] Emergency Department   Date of Visit:  7/11/2019           After Visit Summary Signature Page    I have received my discharge instructions, and my questions have been answered. I have discussed any challenges I see with this plan with the nurse or doctor.    ..........................................................................................................................................  Patient/Patient Representative Signature      ..........................................................................................................................................  Patient Representative Print Name and Relationship to Patient    ..................................................               ................................................  Date                                   Time    ..........................................................................................................................................  Reviewed by Signature/Title    ...................................................              ..............................................  Date                                               Time          22EPIC Rev 08/18

## 2019-07-11 NOTE — ED PROVIDER NOTES
History     Chief Complaint   Patient presents with     Feeding Problems     g tube is clogged per LTC facility       HPI  Sung Osman is a 74 year old male who vent presents from long-term care facility with a history of prior ureteral vascular accident, tracheostomy, gastrostomy tube, prior NSTEMI encephalopathy, and nonverbal.  He presents because of a plugged G-tube could not be flushed at the nursing home.      Prior to his transfer to this facility 1 of the care providers had mentioned that he had felt warm and there was some concern that he may have developed a fever although none was documented.  He was not discussed on sign out from the nursing home who contacted our facility.  He presented here with no fever.  No obvious respiratory symptoms, cough, signs of shortness of breath.   He is nonverbal and other review of systems cannot be performed.  However the patient is able to nod yes or no and nods that he feels okay and that he has no other concerns other than the G-tube.    Allergies:  No Known Allergies    Problem List:    Patient Active Problem List    Diagnosis Date Noted     Other and unspecified alcohol dependence, unspecified drinking behavior 10/11/2006     Priority: High     Off etoh in Jan 2010       Phimosis 07/09/2019     Priority: Medium     Major depressive disorder, single episode, severe (H) 07/04/2019     Priority: Medium     Oropharyngeal dysphagia 07/04/2019     Priority: Medium     Tracheostomy care (H) 07/04/2019     Priority: Medium     Weakness due to acute cerebrovascular accident (CVA) (H) 07/04/2019     Priority: Medium     Feeding by G-tube (H) 07/04/2019     Priority: Medium     Acute respiratory failure with hypoxia (H) 05/22/2019     Priority: Medium     Alcoholic cirrhosis (H) 05/22/2019     Priority: Medium     Acute encephalopathy 05/19/2019     Priority: Medium     Gastrostomy tube dependent (H) 05/19/2019     Priority: Medium     Muscular wasting and disuse  atrophy 05/19/2019     Priority: Medium     Physical deconditioning 05/19/2019     Priority: Medium     Severe protein-calorie malnutrition (H) 05/19/2019     Priority: Medium     History of cerebrovascular accident with residual effects 05/19/2019     Priority: Medium     CAD, multiple vessel 05/02/2019     Priority: Medium     Added automatically from request for surgery [id number]       NSTEMI (non-ST elevated myocardial infarction) (H) 03/13/2019     Priority: Medium     Added automatically from request for surgery [id number]  Added automatically from request for surgery [id number]       CKD (chronic kidney disease) stage 2, GFR 60-89 ml/min 03/13/2019     Priority: Medium     Ischemic cardiomyopathy 03/01/2019     Priority: Medium     3/2019 [facility] NSTEMI.  Echo LVEF 25-30%, +WMA, no clear apical thrombus, norm RV, RAP 3.  3/2019 [facility] NSTEMI.  Echo LVEF 25-30%, +WMA, no clear apical thrombus, norm RV, RAP 3.       Cancer screening 06/26/2012     Priority: Medium     2012 colonoscopy refused  Fit test offered       Hyperlipidemia with target LDL less than 130 01/26/2010     Priority: Medium     But history of critical liver disease---no meds given  Diagnosis updated by automated process. Provider to review and confirm.       Tobacco abuse 01/26/2010     Priority: Medium     Inguinal hernia 10/15/2007     Priority: Medium     Right inguinal  unrepaired  Problem list name updated by automated process. Provider to review       Abnormal glucose 07/24/2007     Priority: Medium     07  Problem list name updated by automated process. Provider to review       Hypertrophy of prostate with urinary obstruction 05/03/2007     Priority: Medium     Problem list name updated by automated process. Provider to review       Other symptoms involving urinary system 04/04/2007     Priority: Medium     Retention , catheter when at [facility] for liver failure in Jan 07  Problem list name updated by automated process. Provider to review and confirm        Essential hypertension      Priority: Medium     Problem list name updated by automated process. Provider to review       Ascites 03/15/2007     Priority: Medium     Serial paracentesis  Problem list name updated by automated process. Provider to review and confirm  Imo Update utility       Hyperlipidemia      Priority: Medium     2012  Statin no prescribed due to critical liver ds.  Problem list name updated by automated process. Provider to review          Past Medical History:    Past Medical History:   Diagnosis Date     Intestinal infection due to Clostridium difficile 10/11/2006     Postoperative urinary retention 5/19/2019       Past Surgical History:    No past surgical history on file.    Family History:    Family History   Problem Relation Age of Onset     Heart Disease Father      Alzheimer Disease Brother         dementia       Social History:  Marital Status:   [2]  Social History     Tobacco Use     Smoking status: Current Every Day Smoker     Packs/day: 1.00     Years: 45.00     Pack years: 45.00     Types: Cigarettes     Smokeless tobacco: Never Used   Substance Use Topics     Alcohol use: No     Drug use: No        Medications:      acetaminophen (TYLENOL) 325 MG tablet   albuterol (PROVENTIL HFA) 108 (90 Base) MCG/ACT inhaler   aspirin (ASA) 81 MG chewable tablet   atorvastatin (LIPITOR) 80 MG tablet   bisacodyl (DULCOLAX) 10 MG suppository   ciprofloxacin (CIPRO) 250 MG/5ML (5%) suspension   doxazosin (CARDURA) 2 MG tablet   escitalopram (LEXAPRO) 10 MG tablet   fish oil-omega-3 fatty acids (FISH OIL) 1000 MG capsule   Furosemide (LASIX) 20 MG tablet   Heparin Sodium, Porcine, (HEPARIN, PORCINE,) 5000 UNIT/ML SOLN injection   magnesium oxide (MAG-OX) 400 MG tablet   melatonin 3 MG tablet   metoprolol tartrate (LOPRESSOR) 25 MG tablet   miconazole (MICATIN) 2 % AERP powder   mirtazapine (REMERON) 7.5 MG tablet   nitroGLYcerin (NITROSTAT) 0.4 MG sublingual tablet   nystatin (MYCOSTATIN)  927872 UNIT/GM external powder   omeprazole (PRILOSEC OTC) 20 MG EC tablet   oxyCODONE (ROXICODONE) 5 MG tablet   protein modular (PROSOURCE NO CARB)   traZODone (DESYREL) 50 MG tablet         Review of Systems   Unable to perform ROS: Patient nonverbal       Physical Exam   BP: 126/83  Heart Rate: 92  Temp: 98.4  F (36.9  C)  Resp: 16  Weight: 81.6 kg (180 lb)  SpO2: 96 %      Physical Exam   Constitutional: No distress.   HENT:   Mouth/Throat: Oropharynx is clear and moist.   Eyes: Conjunctivae are normal.   Neck: Neck supple.   Cardiovascular: Normal rate, regular rhythm and normal heart sounds. Exam reveals no friction rub.   No murmur heard.  Pulmonary/Chest: Effort normal and breath sounds normal. No stridor. No respiratory distress. He has no wheezes. He has no rales.   Abdominal: Soft. Bowel sounds are normal. He exhibits distension (mild). He exhibits no mass. There is no tenderness. There is no guarding.   Musculoskeletal: He exhibits no edema.   Neurological: He exhibits normal muscle tone.   Skin: He is not diaphoretic. No pallor.       ED Course        Procedures               Critical Care time:  none               Results for orders placed or performed during the hospital encounter of 07/11/19 (from the past 24 hour(s))   CBC with platelets differential   Result Value Ref Range    WBC 16.1 (H) 4.0 - 11.0 10e9/L    RBC Count 4.13 (L) 4.4 - 5.9 10e12/L    Hemoglobin 11.2 (L) 13.3 - 17.7 g/dL    Hematocrit 36.5 (L) 40.0 - 53.0 %    MCV 88 78 - 100 fl    MCH 27.1 26.5 - 33.0 pg    MCHC 30.7 (L) 31.5 - 36.5 g/dL    RDW 16.4 (H) 10.0 - 15.0 %    Platelet Count 261 150 - 450 10e9/L    Diff Method Automated Method     % Neutrophils 76.0 %    % Lymphocytes 8.3 %    % Monocytes 10.1 %    % Eosinophils 4.6 %    % Basophils 0.4 %    % Immature Granulocytes 0.6 %    Nucleated RBCs 0 0 /100    Absolute Neutrophil 12.2 (H) 1.6 - 8.3 10e9/L    Absolute Lymphocytes 1.3 0.8 - 5.3 10e9/L    Absolute Monocytes 1.6 (H)  0.0 - 1.3 10e9/L    Absolute Eosinophils 0.7 0.0 - 0.7 10e9/L    Absolute Basophils 0.1 0.0 - 0.2 10e9/L    Abs Immature Granulocytes 0.1 0 - 0.4 10e9/L    Absolute Nucleated RBC 0.0    Comprehensive metabolic panel   Result Value Ref Range    Sodium 138 133 - 144 mmol/L    Potassium 4.0 3.4 - 5.3 mmol/L    Chloride 104 94 - 109 mmol/L    Carbon Dioxide 27 20 - 32 mmol/L    Anion Gap 7 3 - 14 mmol/L    Glucose 82 70 - 99 mg/dL    Urea Nitrogen 35 (H) 7 - 30 mg/dL    Creatinine 1.21 0.66 - 1.25 mg/dL    GFR Estimate 58 (L) >60 mL/min/[1.73_m2]    GFR Estimate If Black 67 >60 mL/min/[1.73_m2]    Calcium 9.4 8.5 - 10.1 mg/dL    Bilirubin Total 0.5 0.2 - 1.3 mg/dL    Albumin 2.9 (L) 3.4 - 5.0 g/dL    Protein Total 7.7 6.8 - 8.8 g/dL    Alkaline Phosphatase 417 (H) 40 - 150 U/L    ALT 95 (H) 0 - 70 U/L    AST 68 (H) 0 - 45 U/L   Lactic acid whole blood   Result Value Ref Range    Lactic Acid 1.4 0.7 - 2.0 mmol/L   Blood culture   Result Value Ref Range    Specimen Description Blood Right Arm     Special Requests Aerobic and anaerobic bottles received     Culture Micro PENDING    Lipase   Result Value Ref Range    Lipase 156 73 - 393 U/L   UA reflex to Microscopic and Culture   Result Value Ref Range    Color Urine Yellow     Appearance Urine Cloudy     Glucose Urine Negative NEG^Negative mg/dL    Bilirubin Urine Negative NEG^Negative    Ketones Urine Negative NEG^Negative mg/dL    Specific Gravity Urine 1.012 1.003 - 1.035    Blood Urine Negative NEG^Negative    pH Urine 7.0 5.0 - 7.0 pH    Protein Albumin Urine Negative NEG^Negative mg/dL    Urobilinogen mg/dL 0.0 0.0 - 2.0 mg/dL    Nitrite Urine Negative NEG^Negative    Leukocyte Esterase Urine Large (A) NEG^Negative    Source Catheterized Urine     RBC Urine 6 (H) 0 - 2 /HPF    WBC Urine 19 (H) 0 - 5 /HPF    Bacteria Urine Few (A) NEG^Negative /HPF    Amorphous Crystals Few (A) NEG^Negative /HPF   Abdomen US, limited (RUQ only)    Narrative    ULTRASOUND  ABDOMEN  LIMITED 7/11/2019 3:10 PM    HISTORY: Elevated alkaline phosphatase and white blood cell count.    COMPARISON: 1/10/2007.    FINDINGS:  Gallbladder: A small amount of sludge is seen in the gallbladder  lumen, significantly less than on the prior exam. No gallstones,  gallbladder wall thickening or pericholecystic fluid.    Common bile duct: Normal at 0.6 cm.    Liver: Normal.    Pancreas: Normal.    Right kidney: Normal.      Impression    IMPRESSION:   1. Small amount of gallbladder sludge.  2. Otherwise normal right upper quadrant ultrasound.     CT Abdomen Pelvis w Contrast    Narrative    CT ABDOMEN AND PELVIS WITH CONTRAST 7/11/2019 3:37 PM    HISTORY: Elevated liver function tests. History of cirrhosis.    TECHNIQUE: Helical axial scans from dome of liver through pubic  symphysis with 88 mL Isovue-370 IV contrast. Radiation dose for this  scan was reduced using automated exposure control, adjustment of the  mA and/or kV according to patient size, or iterative reconstruction  technique.    COMPARISON: 1/3/2007.    FINDINGS: Small amount of scattered platelike atelectasis in the  posterior lung bases bilaterally, new since the prior exam. Incidental  note of pleural calcifications anteriorly in the left hemithorax  (image 1 of series 2). This may be related to asbestos exposure.  Interval resolution of previously seen large amount of ascites.  Lobulated contour of the liver suggesting cirrhosis, unchanged since  the prior exam. No focal liver lesions. 1 cm lucency at the superior  posterior spleen. This was not identified on the prior study but may  not have been seen due to lack of intravenous contrast. This is  nonspecific, but could represent a benign cyst. A similar lucency in  the inferior medial spleen measuring 1.6 cm is also probably new and  nonspecific. The pancreas, right adrenal gland and kidneys bilaterally  are unremarkable. Small left adrenal gland mass without change, likely  a benign adenoma.  Extensive vascular calcifications. Small fusiform  infrarenal abdominal aortic aneurysm measuring 3.1 cm. A  gastrojejunostomy tube is present with its tip in the jejunum. There  is new abnormal thickening of the wall throughout the descending  colon. Minimal pericolonic fat induration is also present around the  descending colon and the findings are consistent with colitis (image  60 of series 3 and images 38 through 76 of series 2). Remainder of the  bowel and mesentery in the upper abdomen appear normal.    Scans through the pelvis show a Box catheter in the urinary bladder  which shows a relatively thick wall. No additional acute appearing  abnormality or free fluid. The appendix is not definitely identified,  but there is no CT evidence for appendicitis.      Impression    IMPRESSION:  1. Few new lucencies in the spleen of uncertain etiology and  significance. If clinically indicated, an ultrasound exam could be  obtained to determine if these are benign cysts.  2. Stable small left adrenal gland mass consistent with an adenoma.  3. Extensive vascular calcifications and small fusiform abdominal  aortic aneurysm measuring up to 3.1 cm.  4. Findings consistent with acute colitis throughout the descending  colon.  5. Pleural calcification is anterior in the visualized left lower  hemithorax may be related to prior asbestos exposure.  6. Lobulated contour of the liver suggesting cirrhosis.  7. Gastrojejunostomy tube and Box catheter are noted. Thick-walled  urinary bladder.       Medications - No data to display    Assessments & Plan (with Medical Decision Making)     MDM: Sung Osman is a 74 year old male who presents with obstructed G-tube from nursing home [facility] -and this was easily cleared by nursing here.  However there was some concern about tactile warmth and has been recently treated for UTI at [facility] day #3 on Cipro and also with urinary obstruction and a history of phimosis.   A blood count  chemistry panel and lactic acid were performed due to his tactile warmth and his LFTs are markedly abnormal with an alkaline phosphatase greater than 400 and ALT and AST that are also increased.  I discussed with Giselle chavarria, his medical provider at [facility], and she notes that he has not had liver abnormalities that she knows of in the past.  She did asked if we could catheterize him with a Box catheter indwelling as he immediately prior to his being sent to this emergency department he had had obstruction that required urinary catheter single placement and removal with 550 cc seen.      I have also reviewed his pulsed form that shows selective treatment may be used.  Will pursue right upper quadrant ultrasound for cholecystitis and if this is negative will obtain CT of the abdomen pelvis for obstructive lesions.      His white count is elevated but he has no sirs criteria at this time and again lactic acid is normal.  This may be related to his urinary tract infection, or this may be related to his elevated LFTs.  He is not on corticosteroids.          ultrasound and CT demonstrate no obvious significant new conditions including no signs of cholecystitis or new lesions to suggest biliary obstruction.  The alkaline phosphatase may be high due to bone disease.  Recommend reevaluation at the nursing home.  He does have an elevated white count and clearly a UTI which is currently being treated recommend recheck on that.    Incidentally on CT we see bowel wall thickening colitis but he has no abdominal pain and no obvious diarrhea here.  If he were to have diarrhea would recommend C. difficile testing and further evaluation for colitis but this may simply be an incidental finding on CT.    He will be sent back to the nursing home for continued management.  A Box catheter was placed here because of significant urinary retention and at the request of Giselle Chavarria, [facility] home provider.      I have reviewed the  nursing notes.    I have reviewed the findings, diagnosis, plan and need for follow up with the patient.          Medication List      There are no discharge medications for this visit.         Final diagnoses:   Attention to G-tube (H)   Obstruction of catheter, initial encounter - This is cleared.   LFT elevation - No serious findings on u/s or CT.  could be bone source,  re-eval at [facility]   Urinary retention   Urinary tract infection without hematuria, site unspecified   Phimosis   Feeding by G-tube (H)   Alcoholic cirrhosis, unspecified whether ascites present (H)   History of cerebrovascular accident with residual effects   Nonverbal   Colitis - findings on CT - but no obvious abd pain, GI symptoms.  This may be incidental and imaging only result.  - if associated with diarrhea, recommend C Diff testing, and possibly enteric bacteria EVIN.   Urinary retention with incomplete bladder emptying - lópez catheter placed.       7/11/2019   [facility] EMERGENCY DEPARTMENT     Kris Sherman MD  07/11/19 [id number]

## 2019-07-11 NOTE — ED NOTES
"Pt presents to ED from LT facility d/t g tube being clogged.  EMS reported patient being tachy and febrile.  Pt is not tachy or febrile in department.  VSS.  Pt is alert and oriented, but per EMS he is nonverbal.  Pt feels \"not good\" but is not specific as to what is hurting him.  Per EMS patient is jean paul lift at LT, per patient he ambulates.  Pt is resting in dept with call light at bedside.    "

## 2019-07-11 NOTE — ED NOTES
EMERGENCY DEPARTMENT HISTORY AND PHYSICAL EXAM      Date: 6/21/2018  Patient Name: Rock Little    History of Presenting Illness     Chief Complaint   Patient presents with    Rectal Bleeding     Ambulatory w/ c/o bright, red rectal bleeding with muscus since yesterday, pt reports no BM since Monday morning. Pt denies N/V & reports some abd cramping that she states is normal for her r/t endometriosis.  Abdominal Cramping       History Provided By: Patient    HPI: Rock Little, 51 y.o. female with PMHx significant for endometriosis, presents ambulatory to the ED with cc of multiple episodes of hematochezia with associated mucous since yesterday morning. She reports visualizing blood after wiping. She also c/o associated cramping, lower abdominal pain since yesterday morning. She reports taking Motrin with no relief in abdominal pain. She notes having a bowel movement in the ED, but she states that prior to today, her last normal bowel movement was 4 days ago. She reports having a colonoscopy ~ 20 years ago with no significant findings. She denies a history of hemorrhoids, recent ABX use, or straining with her bowel movements. She specifically denies melena, rectal pain, dysuria, hematuria, nausea, vomiting, or other complaints at this time. She denies any family h/o of ulcerative colitis or Chron's disease. There are no other complaints, changes, or physical findings at this time. PCP: Grady Ceja MD    Current Outpatient Prescriptions   Medication Sig Dispense Refill    polyethylene glycol (MIRALAX) 17 gram/dose powder Take 17 g by mouth daily. 1 tablespoon with 8 oz of water daily 289 g 0    traMADol (ULTRAM) 50 mg tablet Take 1 Tab by mouth every six (6) hours as needed for Pain. Max Daily Amount: 200 mg. Indications: Pain 10 Tab 0    phenylephrine-witch hazel (PREPARATION H,PE, WITCH HAZEL,) 0.25-50 % topical gel Apply  to affected area two (2) times a day.  1 Tube 0    Report called to [facility] - questions answered. Flushed tube(s) again - patent and easily flushed. EMS here to transport - denies any needs or concerns.   butalbital-acetaminophen-caff (FIORICET) -40 mg per capsule Take 1 Cap by mouth every four (4) hours as needed for Pain. 20 Cap 0    dicyclomine (BENTYL) 10 mg capsule Take 1 Cap by mouth four (4) times daily for 5 days. 20 Cap 0    ibuprofen (MOTRIN) 200 mg tablet Take  by mouth.  valsartan (DIOVAN) 40 mg tablet Take 1 Tab by mouth daily. 30 Tab 5    megestrol (MEGACE) 40 mg tablet TAKE ONE TABLET BY MOUTH TWO TIMES DAILY 60 Tab 6       Past History     Past Medical History:  Past Medical History:   Diagnosis Date    Endometriosis     HSV-2 infection     Hypercholesterolemia 3/2/2012       Past Surgical History:  Past Surgical History:   Procedure Laterality Date    [facility]  11[facility]24/[facility]       Family History:  Family History   Problem Relation Age of Onset    Cancer Mother      Cervix    Hypertension Mother     Breast Cancer Mother      onset: 61       Social History:  Social History   Substance Use Topics    Smoking status: Never Smoker    Smokeless tobacco: Never Used    Alcohol use Yes      Comment: occ       Allergies:  No Known Allergies  Review of Systems   Review of Systems   Constitutional: Negative. Negative for chills and fever. HENT: Negative. Negative for congestion, facial swelling, rhinorrhea, sore throat, trouble swallowing and voice change. Eyes: Negative. Respiratory: Negative. Negative for apnea, cough, chest tightness, shortness of breath and wheezing. Cardiovascular: Negative. Negative for chest pain, palpitations and leg swelling. Gastrointestinal: Positive for abdominal pain and blood in stool (Hematochezia). Negative for abdominal distention, constipation, diarrhea, nausea, rectal pain and vomiting. Endocrine: Negative. Negative for cold intolerance, heat intolerance and polyuria. Genitourinary: Negative. Negative for difficulty urinating, dysuria, flank pain, frequency, hematuria and urgency. Musculoskeletal: Negative.   Negative for arthralgias, back pain, myalgias, neck pain and neck stiffness. Skin: Negative. Negative for color change and rash. Neurological: Negative. Negative for dizziness, syncope, facial asymmetry, speech difficulty, weakness, light-headedness, numbness and headaches. Hematological: Negative. Does not bruise/bleed easily. Psychiatric/Behavioral: Negative. Negative for confusion and self-injury. The patient is not nervous/anxious. Physical Exam   Physical Exam   Constitutional: She is oriented to person, place, and time. She appears well-developed and well-nourished. No distress. HENT:   Head: Normocephalic and atraumatic. Mouth/Throat: Oropharynx is clear and moist. No oropharyngeal exudate. Eyes: Conjunctivae and EOM are normal. Pupils are equal, round, and reactive to light. Neck: Normal range of motion. Cardiovascular: Normal rate, regular rhythm and normal heart sounds. Exam reveals no gallop and no friction rub. No murmur heard. Pulmonary/Chest: Effort normal and breath sounds normal. No respiratory distress. She has no wheezes. She has no rales. She exhibits no tenderness. Abdominal: Soft. Bowel sounds are normal. She exhibits no distension and no mass. There is no tenderness. There is no rebound and no guarding. Genitourinary:   Genitourinary Comments: Rectal Exam: 1 external hemorrhoids present, non-thrombosed. No hard stool palpated, no active bleeding. Musculoskeletal: Normal range of motion. She exhibits no edema, tenderness or deformity. Neurological: She is alert and oriented to person, place, and time. She displays normal reflexes. No cranial nerve deficit. She exhibits normal muscle tone. Coordination normal.   Skin: Skin is warm. No rash noted. She is not diaphoretic. Psychiatric: She has a normal mood and affect. Nursing note and vitals reviewed.     Diagnostic Study Results   Labs -     Recent Results (from the past 12 hour(s))   CBC W/O DIFF    Collection Time: 06/21/18  9:49 AM   Result Value Ref Range    WBC 8.9 3.6 - 11.0 K/uL    RBC 4.61 3.80 - 5.20 M/uL    HGB 14.0 11.5 - 16.0 g/dL    HCT 41.5 35.0 - 47.0 %    MCV 90.0 80.0 - 99.0 FL    MCH 30.4 26.0 - 34.0 PG    MCHC 33.7 30.0 - 36.5 g/dL    RDW 13.0 11.5 - 14.5 %    PLATELET 788 234 - 394 K/uL    MPV 10.8 8.9 - 12.9 FL    NRBC 0.0 0  WBC    ABSOLUTE NRBC 0.00 0.00 - 6.78 K/uL   METABOLIC PANEL, COMPREHENSIVE    Collection Time: 06/21/18  9:49 AM   Result Value Ref Range    Sodium 141 136 - 145 mmol/L    Potassium 3.8 3.5 - 5.1 mmol/L    Chloride 107 97 - 108 mmol/L    CO2 27 21 - 32 mmol/L    Anion gap 7 5 - 15 mmol/L    Glucose 84 65 - 100 mg/dL    BUN 6 6 - 20 MG/DL    Creatinine 0.67 0.55 - 1.02 MG/DL    BUN/Creatinine ratio 9 (L) 12 - 20      GFR est AA >60 >60 ml/min/1.73m2    GFR est non-AA >60 >60 ml/min/1.73m2    Calcium 8.9 8.5 - 10.1 MG/DL    Bilirubin, total 0.4 0.2 - 1.0 MG/DL    ALT (SGPT) 20 12 - 78 U/L    AST (SGOT) 13 (L) 15 - 37 U/L    Alk. phosphatase 63 45 - 117 U/L    Protein, total 7.6 6.4 - 8.2 g/dL    Albumin 3.9 3.5 - 5.0 g/dL    Globulin 3.7 2.0 - 4.0 g/dL    A-G Ratio 1.1 1.1 - 2.2     PTT    Collection Time: 06/21/18  9:49 AM   Result Value Ref Range    aPTT 26.1 22.1 - 32.0 sec    aPTT, therapeutic range     58.0 - 77.0 SECS   PROTHROMBIN TIME + INR    Collection Time: 06/21/18  9:49 AM   Result Value Ref Range    INR 1.0 0.9 - 1.1      Prothrombin time 10.3 9.0 - 11.1 sec   OCCULT BLOOD, STOOL    Collection Time: 06/21/18  9:50 AM   Result Value Ref Range    Occult blood, stool POSITIVE (A) NEG         Radiologic Studies -    INDICATION:   constipation      COMPARISON: 4/10/2016     FINDINGS:     Supine and upright views of the abdomen demonstrate a nonobstructive bowel gas  pattern. There is evidence of moderate constipation. There is no free air. No  abnormal calcifications are identified.  The osseous structures are normal.     IMPRESSION  IMPRESSION:  Evidence of moderate constipation.              Medical Decision Making   I am the first provider for this patient. I reviewed the vital signs, available nursing notes, past medical history, past surgical history, family history and social history. Vital Signs-Reviewed the patient's vital signs. Patient Vitals for the past 12 hrs:   Temp Pulse Resp BP SpO2   06/21/18 1030 - - - (!) 139/95 97 %   06/21/18 0912 98.4 °F (36.9 °C) 88 15 (!) 150/95 99 %       Records Reviewed: Nursing Notes, Old Medical Records, Previous Radiology Studies and Previous Laboratory Studies    Provider Notes (Medical Decision Making):   DDx: GI bleed, hemorrhoids, constipation, NSAID use.    51 y.o. F presents to the ED with hematochezia. Vital signs are stable, physical exam is benign. Will perform rectal exam and obtain screening labs, abdominal xray, and re-assess. ED Course:   Initial assessment performed. The patients presenting problems have been discussed, and they are in agreement with the care plan formulated and outlined with them. I have encouraged them to ask questions as they arise throughout their visit. Progress Note:  9:25 AM  The patient was counseled on the risks of increased/frequent NSAID use, which includes possibility for developing gastric ulcers. She conveys her understanding of this . Written by CAROL Monaco, as dictated by Aiden Will MD.    Procedure Note - Rectal Exam:   9:55 AM  Performed by: Aiden Will MD  Chaperoned by: René Mallory ED RN  Rectal exam performed. Brown stool was collected. Stool was collected and sent to the lab for Hemoccult testing. The procedure took 1-15 minutes, and pt tolerated well. Progress Note:  10:22 AM  Pt is hemoccult positive. Reviewed the patient's blood work, and she is hemodynamically stable. Will discharge home with Colorectal Surgery and PCP follow up.   Written by CAROL Monaco, as dictated by Aiden Will MD.    Medications   dicyclomine (BENTYL) capsule 10 mg (10 mg Oral Given 6/21/18 0952)   butalbital-acetaminophen-caffeine (FIORICET, ESGIC) -40 mg per tablet 1 Tab (1 Tab Oral Given 6/21/18 1002)     Progress Note:  Pt states she feels much better; pain resolved; denies any nausea; no new symptoms; pt able to tolerate PO and ambulate without issues; pt clinically safe for discharge home with close PCP f/u. At time of discharge, pt had stable vitals and had no questions or concerns, and was very satisfied with overall care. Critical Care Time: 0 minutes    Disposition:  Discharge Note:  10:25 AM  The pt is ready for discharge. The pt's signs, symptoms, diagnosis, and discharge instructions have been discussed and pt has conveyed their understanding. The pt is to follow up as recommended or return to ER should their symptoms worsen. Plan has been discussed and pt is in agreement. PLAN:  1. Discharge Medication List as of 6/21/2018 10:28 AM      START taking these medications    Details   polyethylene glycol (MIRALAX) 17 gram/dose powder Take 17 g by mouth daily. 1 tablespoon with 8 oz of water daily, Normal, Disp-289 g, R-0      traMADol (ULTRAM) 50 mg tablet Take 1 Tab by mouth every six (6) hours as needed for Pain. Max Daily Amount: 200 mg. Indications: Pain, Print, Disp-10 Tab, R-0      phenylephrine-witch hazel (PREPARATION H,PE, WITCH HAZEL,) 0.25-50 % topical gel Apply  to affected area two (2) times a day., Print, Disp-1 Tube, R-0      butalbital-acetaminophen-caff (FIORICET) -40 mg per capsule Take 1 Cap by mouth every four (4) hours as needed for Pain., Print, Disp-20 Cap, R-0      dicyclomine (BENTYL) 10 mg capsule Take 1 Cap by mouth four (4) times daily for 5 days. , Print, Disp-20 Cap, R-0         CONTINUE these medications which have NOT CHANGED    Details   ibuprofen (MOTRIN) 200 mg tablet Take  by mouth., Historical Med      valsartan (DIOVAN) 40 mg tablet Take 1 Tab by mouth daily. , Normal, Disp-30 Tab, R-5 megestrol (MEGACE) 40 mg tablet TAKE ONE TABLET BY MOUTH TWO TIMES DAILY, Normal, Disp-60 Tab, R-6           2. Follow-up Information     Follow up With Details Comments [address], [place]   [address]  [phone]      [facility] EMERGENCY DEPT  As needed [address] Verma Homans, [place] In 2 days  [address]  [address] [address] [phone]          Return to ED if worse     Diagnosis     Clinical Impression:   1. External hemorrhoid    2. Hematochezia    3. Abdominal cramping    4. Lower GI bleed    5. Accelerated hypertension        Attestations: This note is prepared by Monika Roblero, acting as a Scribe for Luz Stallworth MD.    Luz Stallworth MD: The scribe's documentation has been prepared under my direction and personally reviewed by me in its entirety. I confirm that the notes above accurately reflects all work, treatment, procedures, and medical decision making performed by me. This note will not be viewable in [address]

## 2019-07-11 NOTE — ED NOTES
G tube unplugged at gastric outlet with simple flushing techniques. Both J and G outlets flushed with NS - tolerated well. Patient denies any pain or discomfort with flushing.

## 2019-07-11 NOTE — LETTER
7/11/2019        RE: Sung Osman  [address] [place] [address]        [facility]  [facility] Record Number:  [id number]  Place of Service where encounter took place:  [facility] [[id number]]  Chief Complaint   Patient presents with     Nursing Home Acute       HPI:    Sung Osman  is a 74 year old (1945), who is being seen today for an episodic care visit.  HPI information obtained from: facility chart records, facility staff and patient report. Today's concern is:     Urinary retention  Urinary tract infection without hematuria, site unspecified  Phimosis  Gastrostomy tube dependent (H)  Severe protein-calorie malnutrition (H)  Oropharyngeal dysphagia  Weakness due to acute cerebrovascular accident (CVA) (H)   Sung is unable to communicate needs effectively.  Nursing reports Gtube is not functional, cannot instill medications. Also notes abdominal fullness, pain, and minimal urination. Nursing strait cathed for 550 ml, PVR bladder scan showed 650 ml in bladder.       Past Medical and Surgical History reviewed in Epic today.    MEDICATIONS:  Current Outpatient Medications   Medication Sig Dispense Refill     acetaminophen (TYLENOL) 325 MG tablet Take 650 mg by mouth every 12 hours as needed       albuterol (PROVENTIL HFA) 108 (90 Base) MCG/ACT inhaler Inhale 1-2 puffs into the lungs every 4 hours as needed       aspirin (ASA) 81 MG chewable tablet 81 mg by Enteral route daily       atorvastatin (LIPITOR) 80 MG tablet 80 mg by Enteral route daily       bisacodyl (DULCOLAX) 10 MG suppository Place 10 mg rectally daily as needed for constipation       ciprofloxacin (CIPRO) 250 MG/5ML (5%) suspension Take by mouth 2 times daily Give 5 ml via G-Tube two times a day for UTI for  5 Days update NP with sensitivity results       doxazosin (CARDURA) 2 MG tablet Take 2 mg by mouth At Bedtime       escitalopram (LEXAPRO) 10 MG tablet Take 10 mg by  mouth daily       fish oil-omega-3 fatty acids (FISH OIL) 1000 MG capsule Take 2 g by mouth 2 times daily        Furosemide (LASIX) 20 MG tablet Take 1 tablet by mouth every morning. 90 tablet 3     Heparin Sodium, Porcine, (HEPARIN, PORCINE,) 5000 UNIT/ML SOLN injection Inject 5,000 Units Subcutaneous 2 times daily       magnesium oxide (MAG-OX) 400 MG tablet Take 400 mg by mouth daily       melatonin 3 MG tablet 3 mg by Enteral route At Bedtime       metoprolol tartrate (LOPRESSOR) 25 MG tablet 25 mg by Enteral route 2 times daily       miconazole (MICATIN) 2 % AERP powder Apply topically 2 times daily       mirtazapine (REMERON) 7.5 MG tablet 22.5 mg by Enteral route daily       nitroGLYcerin (NITROSTAT) 0.4 MG sublingual tablet For chest pain place 1 tablet under the tongue every 5 minutes for 3 doses. If symptoms persist 5 minutes after 1st dose call 911.       omeprazole (PRILOSEC OTC) 20 MG EC tablet Take 1 tablet (20 mg) by mouth daily       oxyCODONE (ROXICODONE) 5 MG tablet 5-10 mg by Enteral route every 4 hours as needed       protein modular (PROSOURCE NO CARB) Take 1 packet by mouth daily       traZODone (DESYREL) 50 MG tablet 25 mg by Enteral route At Bedtime       nystatin (MYCOSTATIN) 221609 UNIT/GM external powder        REVIEW OF SYSTEMS:  Unobtainable secondary to aphasia.     Objective:  /77   Pulse 89   Temp 99.8  F (37.7  C)   Resp 24   Ht 1.829 m (6')   Wt 84.4 kg (186 lb)   SpO2 95%   BMI 25.23 kg/m     Exam:  GENERAL APPEARANCE:  Alert, in moderate distress  ENT:  Mouth and posterior oropharynx normal, moist mucous membranes, hearing acuity within normal limits   EYES:  EOM, conjunctivae, lids, pupils and irises normal  RESP:  respiratory effort normal, no respiratory distress, Lung sounds CTA  CV:  Auscultation of heart done , rate and rhythm reg, no murmur, no rub or gallop, no edema   ABDOMEN:  Firm, tender in LUQ and pubic region  M/S:   Gait and station abnormal-unable to  ambulate, Digits and nails within normal limits   PSYCH:  insight and judgement difficult to assess as he offers limited conversation     Labs:   Recent labs in [facility] reviewed by me today.     ASSESSMENT/PLAN:  Urinary retention  Urinary tract infection without hematuria, site unspecified  Phimosis  Patient with known UTI and phimosis, now with painful pubic region and 650 ml per bladder scan.  Straight cathed for 550 ml but no small lumen lópez/indwelling catheter in the building and would need to be ordered.     Gastrostomy tube dependent (H)  Severe protein-calorie malnutrition (H)  Oropharyngeal dysphagia  Complete oral dysphagia and now G tube is not patent, unable to flush or check residual.  J tube is patent.        Weakness due to acute cerebrovascular accident (CVA) (H)  Ongoing, with significant weakness requiring total cares and assist with transfers.       Orders written by provider at facility and transcribed by : Jazmyn Ware CMA  1. PVR and start Cath now for >650 ml in bladder.  2.  Hold TF for now (jejimal port)  3.  To ED for evaluation of:   G-tube clogged-cannot flush or pull back.   1. Urinary retension   2. Phimosis-recommend  lópez cath placed.   3.  Clogged G-tube         Electronically signed by:  SHAUN Steward CNP             Sincerely,        SHAUN Steward CNP

## 2019-07-11 NOTE — DISCHARGE INSTRUCTIONS
ICD-10-CM    1. Attention to G-tube (H) Z43.1 Blood culture     Lipase     Lipase     UA reflex to Microscopic and Culture     Urine Culture Aerobic Bacterial     Urine Culture Aerobic Bacterial     CANCELED: Lipase     CANCELED: Lipase     CANCELED: Urine Culture Aerobic Bacterial   2. Obstruction of catheter, initial encounter T85.698A     This is cleared.   3. LFT elevation R94.5     No serious findings on u/s or CT.  could be bone source,  re-eval at [facility]   4. Urinary retention R33.9    5. Urinary tract infection without hematuria, site unspecified N39.0    6. Phimosis N47.1    7. Feeding by G-tube (H) Z93.1    8. Alcoholic cirrhosis, unspecified whether ascites present (H) K70.30    9. History of cerebrovascular accident with residual effects I69.90    10. Nonverbal R47.01    11. Colitis K52.9     findings on CT - but no obvious abd pain, GI symptoms.  This may be incidental and imaging only result.  - if associated with diarrhea, recommend C Diff testing, and possibly enteric bacteria EVIN.   12. Urinary retention with incomplete bladder emptying R33.9     lópez catheter placed.

## 2019-07-12 NOTE — RESULT ENCOUNTER NOTE
Emergency Dept/Urgent Care discharge antibiotic (if prescribed): None;  On 7/8/19 given 5 day Rx for Ciprofloxacin  No changes in treatment per Urine culture protocol.

## 2019-07-14 NOTE — TELEPHONE ENCOUNTER
Called by nursing staff at [facility] about patient who has had low O2 sats today.   Pt bedbound  Placed on 2 L/min O2 and sats still 85%  No cough, but sounds congested, per nurse.   Rhonchi and wheezes on exam, neb did not help increase O2 sat.       On Jtube feedings, with abd distention.  Some improvement after BM.         BP okay   No fever.   RR 26    PLAN:  CXR now  Extra dose of furosemide 20 mg now.   Titrate O2 for O2 sat >88-90%, with max 4 L/min    Call back if not better.   Anna Marie Reagan MD

## 2019-07-15 PROBLEM — K52.9 COLITIS: Status: ACTIVE | Noted: 2019-01-01

## 2019-07-15 PROBLEM — J18.9 BILATERAL PNEUMONIA: Status: ACTIVE | Noted: 2019-01-01

## 2019-07-15 PROBLEM — K56.7 ILEUS, UNSPECIFIED (H): Status: ACTIVE | Noted: 2019-01-01

## 2019-07-15 PROBLEM — R79.89 LFT ELEVATION: Status: ACTIVE | Noted: 2019-01-01

## 2019-07-15 NOTE — PROGRESS NOTES
Skin affirmation note    Admitting nurse completed full skin assessment, King score and King interventions. This writer agrees with the initial skin assessment findings.

## 2019-07-15 NOTE — PROGRESS NOTES
[facility] [place] [place] ADMISSION NOTE    Patient admitted to room 2208 at approximately 0420 via cart from emergency room. Patient was accompanied by transport tech.     Verbal SBAR report received from Cindy prior to patient arrival.     Patient trasferred to bed via air pearl. Patient alert and oriented X 1. The patient is not having any pain.  . Admission vital signs: Blood pressure 131/73, pulse 90, temperature 97.9  F (36.6  C), temperature source Oral, resp. rate 16, height 1.829 m (6'), weight 87.9 kg (193 lb 12.6 oz), SpO2 95 %. Patient was oriented to plan of care, call light, bed controls, tv, telephone, bathroom and visiting hours.     Risk Assessment    The following safety risks were identified during admission: fall. Yellow risk band applied: YES.     Skin Initial Assessment    This writer admitted this patient and completed a full skin assessment and King score in the Adult PCS flowsheet. Appropriate interventions initiated as needed.     Secondary skin check completed by Latonya Mejia.    King Risk Assessment  Sensory Perception: 3-->slightly limited  Moisture: 4-->rarely moist  Activity: 2-->chairfast  Mobility: 2-->very limited  Nutrition: 3-->adequate  Friction and Shear: 2-->potential problem  King Score: 16  Bed Support Surface: Atmos Air mattress  Reassessed using Bed Algorithm: No  King Intervention(s) Implemented: assistive lifting/lateral transfer device, draw sheets, heels suspended, HOB elevated 30 degrees or less, incontinence care, moisturize skin, moisture barrier ointment applied, patient /family education on pressure injury prevention, patient education on pressure relief reinforced, pillows between bony prominences, pulse oximeter site changed, repositioned/turned q2hr    Rody Mistry

## 2019-07-15 NOTE — PROGRESS NOTES
[facility]    Sepsis Evaluation Progress Note    Date of Service: 07/15/2019    I was called to see Sung Osman due to abnormal vital signs triggering the Sepsis SIRS screening alert. He is known to have an infection.     Physical Exam    Vital Signs:  Temp: 98  F (36.7  C) Temp src: Oral BP: 121/64 Pulse: 93 Heart Rate: 102 Resp: 18 SpO2: 91 % O2 Device: Oxymizer cannula Oxygen Delivery: 4 LPM    Lab:  Lactic Acid   Date Value Ref Range Status   07/15/2019 1.6 0.7 - 2.0 mmol/L Final     Lactate for Sepsis Protocol   Date Value Ref Range Status   07/15/2019 2.2 (H) 0.7 - 2.0 mmol/L Final     Comment:     Significant value called to and read back by  JOSÉ MIGUEL MOSLEY RN MEDSUR 7/15/19 AT 1835. VB         The patient has unchanged mental status from admission .    The rest of their physical exam is significant for   Unchanged from recent admission.    Assessment and Plan    The SIRS and exam findings are likely due to   sepsis.     ID: The patient is currently on the following antibiotics:  Anti-infectives (From now, onward)    Start     Dose/Rate Route Frequency Ordered Stop    07/15/19 1215  metroNIDAZOLE (FLAGYL) infusion 500 mg      500 mg  over 60 Minutes Intravenous EVERY 6 HOURS 07/15/19 1200      07/15/19 1215  vancomycin (VANCOCIN) 1,750 mg in sodium chloride 0.9 % 500 mL intermittent infusion      1,750 mg  over 2 Hours Intravenous EVERY 12 HOURS 07/15/19 1212      07/15/19 0217  piperacillin-tazobactam (ZOSYN) intermittent infusion 4.5 g      4.5 g  200 mL/hr over 30 Minutes Intravenous EVERY 6 HOURS 07/15/19 0217          Current antibiotic coverage is appropriate for source of infection.    Fluid: Fluid bolus ordered.    Lab: Repeat lactic acid ordered for 2 hours from now.  UPDATE: patient refused any further lab draws today, lactic ordered for AM.      Disposition: The patient will remain on the current unit. We will continue to monitor this patient closely.    Wilfredo BRAR  MD Elyse, MD

## 2019-07-15 NOTE — PHARMACY-VANCOMYCIN DOSING SERVICE
Pharmacy Vancomycin Initial Note  Date of Service July 15, 2019  Patient's  [id number]  74 year old, male    Indication: [facility] Pneumonia    Current estimated CrCl = Estimated Creatinine Clearance: 64 mL/min (based on SCr of 1.24 mg/dL).    Creatinine for last 3 days  7/15/2019: 12:45 AM Creatinine 1.24 mg/dL    Recent Vancomycin Level(s) for last 3 days  No results found for requested labs within last 72 hours.      Vancomycin IV Administrations (past 72 hours)      No vancomycin orders with administrations in past 72 hours.                Nephrotoxins and other renal medications (From now, onward)    Start     Dose/Rate Route Frequency Ordered Stop    07/15/19 1215  vancomycin (VANCOCIN) 1,750 mg in sodium chloride 0.9 % 500 mL intermittent infusion      1,750 mg  over 2 Hours Intravenous EVERY 12 HOURS 07/15/19 1212      07/15/19 0217  piperacillin-tazobactam (ZOSYN) intermittent infusion 4.5 g      4.5 g  200 mL/hr over 30 Minutes Intravenous EVERY 6 HOURS 07/15/19 0217            Contrast Orders - past 72 hours (72h ago, onward)    Start     Dose/Rate Route Frequency Ordered Stop    07/15/19 0128  iopamidol (ISOVUE-370) solution 93 mL      93 mL Intravenous ONCE 07/15/19 0128 07/15/19 0128                Plan:  1.  Start vancomycin  1750 mg IV q12h.   2.  Goal Trough Level: 15-20 mg/L   3.  Pharmacy will check trough levels as appropriate in 1-3 Days.    4. Serum creatinine levels will be ordered daily for the first week of therapy and at least twice weekly for subsequent weeks.    5. Harrell method utilized to dose vancomycin therapy: Method 2    Luna Jameson, PharmD  July 15, 2019

## 2019-07-15 NOTE — ED PROVIDER NOTES
History     Chief Complaint   Patient presents with     Shortness of Breath     pt's oxygen sats on 3 liters per NC at [facility] were 81% on 7/14/19 around 0800 with increased weakness.  Daughter visited pt and requested pt be transferred to hospital.       HPI  Sung Osman is a 74 year old male S/P CABG performed at [facility] in May 2019 who presents by EMS from [facility] for evaluation of increasing hypoxia and confusion.  According to records, the patient was discharged from [place] after nearly 2 months stay in which his trach was decannulated on 6/24/2019.  He is on 2 L via nasal cannula at baseline but for the past 24 hours staff had been noting pulse oxygen levels in the 80s, this evening his daughter requested transfer for evaluation.  The patient is alert but confused in the department, EMS reports this is not his baseline mental status and he is usually oriented.  The patient is without any complaint, will await family for further information.    Allergies:  No Known Allergies    Problem List:    Patient Active Problem List    Diagnosis Date Noted     Other and unspecified alcohol dependence, unspecified drinking behavior 10/11/2006     Priority: High     Off etoh in Jan 2010       Phimosis 07/09/2019     Priority: Medium     Major depressive disorder, single episode, severe (H) 07/04/2019     Priority: Medium     Oropharyngeal dysphagia 07/04/2019     Priority: Medium     Tracheostomy care (H) 07/04/2019     Priority: Medium     Weakness due to acute cerebrovascular accident (CVA) (H) 07/04/2019     Priority: Medium     Feeding by G-tube (H) 07/04/2019     Priority: Medium     Acute respiratory failure with hypoxia (H) 05/22/2019     Priority: Medium     Alcoholic cirrhosis (H) 05/22/2019     Priority: Medium     Acute encephalopathy 05/19/2019     Priority: Medium     Gastrostomy tube dependent (H) 05/19/2019     Priority: Medium     Muscular wasting and disuse atrophy 05/19/2019      Priority: Medium     Physical deconditioning 05/19/2019     Priority: Medium     Severe protein-calorie malnutrition (H) 05/19/2019     Priority: Medium     History of cerebrovascular accident with residual effects 05/19/2019     Priority: Medium     CAD, multiple vessel 05/02/2019     Priority: Medium     Added automatically from request for surgery [id number]       NSTEMI (non-ST elevated myocardial infarction) (H) 03/13/2019     Priority: Medium     Added automatically from request for surgery [id number]  Added automatically from request for surgery [id number]       CKD (chronic kidney disease) stage 2, GFR 60-89 ml/min 03/13/2019     Priority: Medium     Ischemic cardiomyopathy 03/01/2019     Priority: Medium     3/2019 [facility] NSTEMI.  Echo LVEF 25-30%, +WMA, no clear apical thrombus, norm RV, RAP 3.  3/2019 [facility] NSTEMI.  Echo LVEF 25-30%, +WMA, no clear apical thrombus, norm RV, RAP 3.       Cancer screening 06/26/2012     Priority: Medium     2012 colonoscopy refused  Fit test offered       Hyperlipidemia with target LDL less than 130 01/26/2010     Priority: Medium     But history of critical liver disease---no meds given  Diagnosis updated by automated process. Provider to review and confirm.       Tobacco abuse 01/26/2010     Priority: Medium     Inguinal hernia 10/15/2007     Priority: Medium     Right inguinal  unrepaired  Problem list name updated by automated process. Provider to review       Abnormal glucose 07/24/2007     Priority: Medium     07  Problem list name updated by automated process. Provider to review       Hypertrophy of prostate with urinary obstruction 05/03/2007     Priority: Medium     Problem list name updated by automated process. Provider to review       Other symptoms involving urinary system 04/04/2007     Priority: Medium     Retention , catheter when at [facility] for liver failure in Jan 07  Problem list name updated by automated process. Provider to review and confirm       Essential  hypertension      Priority: Medium     Problem list name updated by automated process. Provider to review       Ascites 03/15/2007     Priority: Medium     Serial paracentesis  Problem list name updated by automated process. Provider to review and confirm  Imo Update utility       Hyperlipidemia      Priority: Medium     2012  Statin no prescribed due to critical liver ds.  Problem list name updated by automated process. Provider to review          Past Medical History:    Past Medical History:   Diagnosis Date     Intestinal infection due to Clostridium difficile 10/11/2006     Postoperative urinary retention 5/19/2019       Past Surgical History:    No past surgical history on file.    Family History:    Family History   Problem Relation Age of Onset     Heart Disease Father      Alzheimer Disease Brother         dementia       Social History:  Marital Status:   [2]  Social History     Tobacco Use     Smoking status: Current Every Day Smoker     Packs/day: 1.00     Years: 45.00     Pack years: 45.00     Types: Cigarettes     Smokeless tobacco: Never Used   Substance Use Topics     Alcohol use: No     Drug use: No        Medications:      acetaminophen (TYLENOL) 325 MG tablet   albuterol (PROVENTIL HFA) 108 (90 Base) MCG/ACT inhaler   aspirin (ASA) 81 MG chewable tablet   atorvastatin (LIPITOR) 80 MG tablet   bisacodyl (DULCOLAX) 10 MG suppository   doxazosin (CARDURA) 2 MG tablet   escitalopram (LEXAPRO) 10 MG tablet   fish oil-omega-3 fatty acids (FISH OIL) 1000 MG capsule   Furosemide (LASIX) 20 MG tablet   Heparin Sodium, Porcine, (HEPARIN, PORCINE,) 5000 UNIT/ML SOLN injection   magnesium oxide (MAG-OX) 400 MG tablet   melatonin 3 MG tablet   metoprolol tartrate (LOPRESSOR) 25 MG tablet   miconazole (MICATIN) 2 % AERP powder   mirtazapine (REMERON) 7.5 MG tablet   nitroGLYcerin (NITROSTAT) 0.4 MG sublingual tablet   nystatin (MYCOSTATIN) 296191 UNIT/GM external powder   omeprazole (PRILOSEC OTC) 20 MG  EC tablet   oxyCODONE (ROXICODONE) 5 MG tablet   protein modular (PROSOURCE NO CARB)   traZODone (DESYREL) 50 MG tablet         Review of Systems unable to obtain secondary to clinical condition...      Physical Exam   BP: 113/65  Pulse: 90  Heart Rate: 94  Temp: 98.2  F (36.8  C)  Resp: 17  Weight: 86.2 kg (190 lb)  SpO2: 90 %      Physical Exam  Constitutional:  Well developed, well nourished.  Appears diaphoretic but in no acute distress.  HENT:  Normocephalic and atraumatic.  Symmetric in appearance.  Eyes:  Conjunctivae are normal.  Neck:  Neck supple without JVD.  Cardiovascular:  No cyanosis.  RRR.  No audible murmurs noted.    Respiratory:  Effort normal without sign of respiratory distress.  Bibasilar crackles without wheezing.  Gastrointestinal: Left-sided G-tube placed.  Tense distended abdomen without tenderness or guarding.  No rigidity or rebound tenderness.  Negative Bradshaw's sign.  Negative McBurney's point.    Neurological:  Patient is alert but confused.  Psychiatric:  Normal mood and affect.      ED Course        Procedures               Critical Care time:  none               Results for orders placed or performed during the hospital encounter of 07/15/19 (from the past 24 hour(s))   CBC with platelets differential   Result Value Ref Range    WBC 19.9 (H) 4.0 - 11.0 10e9/L    RBC Count 3.64 (L) 4.4 - 5.9 10e12/L    Hemoglobin 9.9 (L) 13.3 - 17.7 g/dL    Hematocrit 32.2 (L) 40.0 - 53.0 %    MCV 89 78 - 100 fl    MCH 27.2 26.5 - 33.0 pg    MCHC 30.7 (L) 31.5 - 36.5 g/dL    RDW 17.0 (H) 10.0 - 15.0 %    Platelet Count 237 150 - 450 10e9/L    Diff Method Automated Method     % Neutrophils 78.1 %    % Lymphocytes 8.4 %    % Monocytes 11.2 %    % Eosinophils 1.2 %    % Basophils 0.3 %    % Immature Granulocytes 0.8 %    Nucleated RBCs 0 0 /100    Absolute Neutrophil 15.6 (H) 1.6 - 8.3 10e9/L    Absolute Lymphocytes 1.7 0.8 - 5.3 10e9/L    Absolute Monocytes 2.2 (H) 0.0 - 1.3 10e9/L    Absolute  Eosinophils 0.2 0.0 - 0.7 10e9/L    Absolute Basophils 0.1 0.0 - 0.2 10e9/L    Abs Immature Granulocytes 0.2 0 - 0.4 10e9/L    Absolute Nucleated RBC 0.0    Comprehensive metabolic panel   Result Value Ref Range    Sodium 139 133 - 144 mmol/L    Potassium 3.5 3.4 - 5.3 mmol/L    Chloride 103 94 - 109 mmol/L    Carbon Dioxide 27 20 - 32 mmol/L    Anion Gap 9 3 - 14 mmol/L    Glucose 112 (H) 70 - 99 mg/dL    Urea Nitrogen 46 (H) 7 - 30 mg/dL    Creatinine 1.24 0.66 - 1.25 mg/dL    GFR Estimate 56 (L) >60 mL/min/[1.73_m2]    GFR Estimate If Black 65 >60 mL/min/[1.73_m2]    Calcium 9.1 8.5 - 10.1 mg/dL    Bilirubin Total 0.5 0.2 - 1.3 mg/dL    Albumin 2.3 (L) 3.4 - 5.0 g/dL    Protein Total 7.2 6.8 - 8.8 g/dL    Alkaline Phosphatase 326 (H) 40 - 150 U/L    ALT 79 (H) 0 - 70 U/L    AST 57 (H) 0 - 45 U/L   INR   Result Value Ref Range    INR 1.44 (H) 0.86 - 1.14   NT pro BNP   Result Value Ref Range    N-Terminal Pro BNP Inpatient 2,860 (H) 0 - 1,800 pg/mL   Lactic acid whole blood   Result Value Ref Range    Lactic Acid 1.6 0.7 - 2.0 mmol/L   Blood gas venous   Result Value Ref Range    Ph Venous 7.47 (H) 7.32 - 7.43 pH    PCO2 Venous 41 40 - 50 mm Hg    PO2 Venous 36 25 - 47 mm Hg    Bicarbonate Venous 29 (H) 21 - 28 mmol/L    Base Excess Venous 5.0 mmol/L    FIO2 10 nc    Ammonia (on ice)   Result Value Ref Range    Ammonia 28 10 - 50 umol/L   UA with Microscopic   Result Value Ref Range    Color Urine Yellow     Appearance Urine Clear     Glucose Urine Negative NEG^Negative mg/dL    Bilirubin Urine Negative NEG^Negative    Ketones Urine Negative NEG^Negative mg/dL    Specific Gravity Urine 1.018 1.003 - 1.035    Blood Urine Negative NEG^Negative    pH Urine 5.0 5.0 - 7.0 pH    Protein Albumin Urine Negative NEG^Negative mg/dL    Urobilinogen mg/dL 0.0 0.0 - 2.0 mg/dL    Nitrite Urine Negative NEG^Negative    Leukocyte Esterase Urine Trace (A) NEG^Negative    Source Catheterized Urine     WBC Urine 3 0 - 5 /HPF    RBC  Urine 1 0 - 2 /HPF    Mucous Urine Present (A) NEG^Negative /LPF    Hyaline Casts 3 (H) 0 - 2 /LPF   XR Chest 2 Views    Narrative    XR CHEST 2 VIEWS   7/15/2019 1:46 AM     HISTORY: Hypoxia and confusion.    COMPARISON: 1/3/2007.    FINDINGS: Sternal wires and mediastinal clips. No pneumothorax. The  heart is enlarged. There is no pulmonary edema. There are bibasilar  infiltrates, left greater than right. The lungs are otherwise clear.  No pleural effusion.      Impression    IMPRESSION: Bibasilar atelectasis or pneumonia.   CT Abdomen Pelvis w Contrast    Narrative    CT ABDOMEN/PELVIS WITH CONTRAST   7/15/2019 1:46 AM     HISTORY: Delirium, hypoxia, distended abdomen.    TECHNIQUE:  CT abdomen and pelvis with 93 mL Isovue-370 IV. Radiation  dose for this scan was reduced using automated exposure control,  adjustment of the mA and/or kV according to patient size, or iterative  reconstruction technique.    COMPARISON: 7/11/2019.    FINDINGS:    Abdomen: There is dependent atelectasis at the lung bases, increased  since the previous exam. Previous median sternotomy. The heart is  mildly enlarged. The liver has a nodular contour suggesting cirrhosis.  There are again a few small low-attenuation splenic lesions. The  gallbladder, pancreas and right adrenal gland are normal in  appearance. There is again a left adrenal gland nodule measuring up to  2.6 cm. The kidneys are within normal limits. There is extensive  atherosclerotic calcification of the aorta and its branches. 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 No aortic  dissection.    Pelvis: There is mild wall thickening of the descending and sigmoid  colon consistent with colitis, similar to the previous exam. The colon  is distended with gas and fluid. The small bowel is also distended  with gas and fluid. No evidence of obstruction. GJ tube in place.  There is a small amount of ascites in the right inguinal canal. No  free intraperitoneal  gas. Catheter in the urinary bladder.  Degenerative disease in the spine.      Impression    IMPRESSION:  1. Small and large bowel are distended with gas and fluid. No evidence  of obstruction. These findings are consistent with an ileus.  2. There is again wall thickening of the descending and sigmoid colon  consistent with a nonspecific colitis.  3. Cirrhotic liver.  4. Small amount of fluid in the right inguinal canal.       Medications   lidocaine 1 % 0.1-1 mL (has no administration in time range)   lidocaine (LMX4) cream (has no administration in time range)   sodium chloride (PF) 0.9% PF flush 3 mL (has no administration in time range)   sodium chloride (PF) 0.9% PF flush 3 mL (has no administration in time range)   piperacillin-tazobactam (ZOSYN) intermittent infusion 4.5 g (4.5 g Intravenous New Bag 7/15/19 0230)   ciprofloxacin (CIPRO) infusion 400 mg (has no administration in time range)   0.9% sodium chloride BOLUS (has no administration in time range)   iopamidol (ISOVUE-370) solution 93 mL (93 mLs Intravenous Given 7/15/19 0128)   sodium chloride 0.9 % bag 500mL for CT scan flush use (68 mLs Intravenous Given 7/15/19 0128)       Assessments & Plan (with Medical Decision Making)   Sung Osman is a 74 year old male who presented to the department by EMS from local [facility] for evaluation of increasing hypoxia and confusion.  Patient requires 2 L via nasal cannula at baseline but is now requiring 6 L to maintain oxygen saturation >90%.  Patient has bibasilar crackles on auscultation and chest radiograph looks suspicious for bibasilar infiltrates.  Although afebrile, his WBC is nearly 20, lactic acid also within reference range.  Due to significant abdominal distention, CT of abdomen and pelvis was performed and read by radiologist as having findings consistent with ileus but no obstruction.  BNP and troponin are also mildly elevated and likely secondary to cardiac strain, only receiving gentle hydration in  the department.  He will require hospital admission for management of his respiratory status, listed as DNR/DNI and his daughter confirms that he would never want to be intubated again.  They would consent to noninvasive positive pressure ventilation, but otherwise medical management only.  Consulted Dr. Corbin who has accepted ongoing care for the patient at this time.  Temporary transition orders were placed per hospital protocol to prevent any potential delay in patient care.    Patient's daughter been informed of results and the recommendation for admission.  They have verbalized an understanding, all questions answered, and in agreement with the plan.      Disclaimer:  This note consists of symbols derived from keyboarding, dictation, and/or voice recognition software.  As a result, there may be errors in the script that have gone undetected.  Please consider this when interpreting information found in the chart.        I have reviewed the nursing notes.    I have reviewed the findings, diagnosis, plan and need for follow up with the patient.          Medication List      There are no discharge medications for this visit.         Final diagnoses:   Pneumonia of both lower lobes due to infectious organism (H)   Paralytic ileus (H)   Hypoxia   Confusion       7/15/2019   [facility] EMERGENCY DEPARTMENT     Kyree Mendoza,   07/15/19 0551

## 2019-07-15 NOTE — PROGRESS NOTES
Pt unable to answer questions, unable to complete admit. Able to tell me first name, lethargic, falls asleep when being talked to. LS coarse on 4 LPM 97%. Recent trach tear down, meplex covering it. Suction set up in room, G tube, scattered bruising. Scabs, distended abdomen. Uses lift. Here for aspiration pneumonia.

## 2019-07-15 NOTE — H&P
"[facility]    History and Physical - Hospitalist Service       Date of Admission:  7/15/2019    Assessment & Plan   Sung Osman is a 74 year old male admitted on 7/15/2019. He has a complex past medical history including recent CABG, stroke, tracheostomy placement and then recannulization, recovering at [facility] and now presented to the emergency department for evaluation of new hypoxia and confusion, found to have pneumonia and an ileus.    Acute respiratory failure with hypoxia  Bilateral pneumonia - healthcare associated vs aspiration  Complex recent history with prolonged healthcare exposures, intubation, and trach s/p recannulization. Has intermittent O2 needs since his CABG, but no continuous baseline O2 needs prior to admission. Upon admission required 10L supplemental O2. Admit chest x-ray showing bibasilar atelectasis vs pneumonia. Afebrile with leukocytosis upon admission.  - Treat for healthcare associated pneumonia with vancomycin and Zosyn  - Prn suction  - Prn DuoNebs  - Supplemental O2 to maintain sats > 92%    Colitis, possibly infectious  Ileus  Admit CT abdomen & pelvis showing \"small and large bowel are distended with gas and fluid, no small bowel obstruction but findings consistent with ileus. Nonspecific colitis - wall thickening of descending & sigmoid colon.\" Due to prolonged healthcare exposures, cannot rule out C.diff (although no reported diarrhea recently).  - Empiric C.diff treatment with metronidazole and Zosyn  - Send C.diff and enteric stool tests  - NPO, do not use G/J tube either    Concern for encephalopathy vs residual effects from prior stroke  Initially there was concern for acute change in cognitive status, however daughter feels he is in his baseline cognitive state since his prior strokes. Admit ammonia normal. Is ill but not septic. Cannot rule out repeat stroke, although now lower suspicion based on daughter's input.  - Head CT for further " evaluation    LFT elevation  History of alcoholic cirrhosis  Admit alk phos 326, ALT 75, AST 57. Has known history of cirrhosis and alcoholic hepatitis, although no recent alcohol consumption. Had recent abdominal ultrasound on 7/11/19 which was unremarkable other than gallbladder sludge.  - Monitor     Anemia   Admit hemoglobin 9.9, recent baseline between 10-11 after surgery. No known source of bleeding, although is on SQ heparin.  - Monitor    CKD (chronic kidney disease) stage 2, GFR 60-89 ml/min  Admit creatinine 1.24 (baseline 1.2 - 1.5), GFR 56 (baseline 50-60). Chronic, stable.  - Monitor    CAD, multiple vessel, s/p CABG  Ischemic cardiomyopathy  Hyperlipidemia  History of NSTEMI on 3/12/19 and CABG x 4 on 5/22/19 at [facility] with complicated post-op recovery. Most recent echo with EF 50%, mid-distal inferoseptal hypokinesis and mid-apical inferolateral hypokinesis. Admit troponin 0.074, no chest pain. Managed prior to admission with aspirin 81 mg daily, Lipitor 80 mg daily, metoprolol 25 mg bid, lasix 20 mg daily, and prn nitroglycerine.  - Continue prior to admission aspirin, but use rectal administration while NPO  - Change prior to admission metoprolol to IV formulation (2.5 mg q 6 hours, titrate as needed) while NPO  - Hold prior to admission Lipitor and lasix while NPO  - Continue prior to admission prn nitroglycerine  - Trend troponins    Oropharyngeal dysphagia  Gastrostomy tube dependent (H)  Severe protein-calorie malnutrition  Feeding by G-tube  Secondary to history of recent strokes. Had complications with frequent G tube clogs at [facility]. Had been receiving protein supplements prior to admission G-tube.  - Hold administration of meds per G-tube while NPO  - Was on omeprazole for GI protection  - Consider nutrition consult     History of cerebrovascular accident with residual effects  Weakness due to acute cerebrovascular accident (CVA) (H)  History of recent strokes after CABG. Per patient's  daughter he is at his baseline cognitive status (baseline since strokes) upon admission. He does not talk much at baseline. Admit EKG showing an atrial rhythm with biphasic p wave, but no atrial fibrillation/flutter. Managed prior to admission with aspirin 81 mg daily and SQ heparin 5000 U bid.   - Continue prior to admission heparin  - Continue prior to admission aspirin, but rectal while NPO    Essential hypertension  Pressures reviewed, stable. Managed prior to admission with lasix 20 mg q am and metoprolol 25 mg bid.  - Hold prior to admission lasix while NPO  - Change metoprolol to IV formulation while NPO (2.5 mg q 6 hours, titrate as needed)    Hypertrophy of prostate with urinary obstruction  Phimosis  Recent UTI  Had Box upon admission, recently diagnosed with UTI and has been on Cipro. Admit UA was negative for evidence of infection.   - Discontinue prior to admission Cipro  - Continue Box  - May need outpatient urology evaluation for known phimosis    Major depressive disorder, single episode, severe (H)  Patient's daughter feels his depression has been worsening since his surgery. Managed prior to admission with Lexapro 10 mg daily, Remeron 22.5 mg q hs, and melatonin.  - Hold prior to admission Lexapro, Remeron, and melatonin while NPO         Diet: NPO for Medical/Clinical Reasons Except for: Ice Chips, NPO but receiving Tube Feeding    DVT Prophylaxis: Heparin SQ and Pneumatic Compression Devices  Box Catheter: in place, indication: Retention  Code Status: DNR/DNI      Disposition Plan   Expected discharge: 2 - 3 days, recommended to prior living arrangement once antibiotic plan established and , ileus has resolved, and respiratory status improves.  Entered: Manisha Friedman PA-C 07/15/2019, 1:34 PM     The patient's care was discussed with the Attending Physician, Dr. Emily Sharma, Bedside Nurse, Patient and Patient's Family.    Manisha Friedman PA-C  [facility]  [place]    ______________________________________________________________________    Chief Complaint   Increased O2 needs    History is obtained from the patient, his daughter, review of EMR, and emergency department documentation.    History of Present Illness   Sung Osman is a 74 year old male who presented to the emergency department from [facility] due to concern for increased O2 needs and hypoxia.     Patient has a very complex recent medical history. As copied from progress note 7/3/19 (admission note to [facility] by Valerie Chavarria CNP):    HPI information obtained from: facility chart records, facility staff, patient report and [facility] chart review.   Brief Summary of Hospital Course: Sung Osman has a past medical history of BPH, alcohol abuse with cirrhosis, tobacco use (50 p/y history).  He has had a complicated last several months, and is summarized here:  3/12/19-at routine medical exam and noted to have chest pain.    [facility] 3/12-17/19 -found to have new anterolateral NSTEMI with EF 25% and large R pleural effusion originally concerning for mass/cancer but later determined to be pneumonia. He was treated conservatively with medications and discharged to home.   [facility] 4/19-24/19-with new onset R sided weakness, found to have R frontal ischemic CVA and L periventricular basal ganglia infarct.   [facility] 4/19-5/2/19-with chest pain and resolution of R pleural effusion, cardiology planned for CABG  [facility] 5/9-22/19-CABG x 4 with L LE vein harvest with multiple complications.  He had difficulty being extubated, difficulty awakening after surgery.  Found to have multiple new brain infarcts and R sided weakness.  He needed to be re-intubated, GJ tube placed for nutrition    [facility] 5/22-7/3/19-transitioned to [facility] for acute care and was successfully extubated and trach decannulated on 6/24/19.  GJ tube in place due to dysphagia and he is not tolerating any oral intake.  Did have some difficulty  with urinary retention, noted to have significant phimosis which may need surgical intervention, as well as severe weakness due to prolonged illness. EF now about 55%, found to have R ICA moderate to severe stenosis as well as L ICA 50% stenosis    Patient has been at [facility] since 7/3/19. There have been complications with his G tube clogging, for which he was evaluated twice in the emergency department on 7/4 and 7/11. He was also on Cipro for a UTI diagnosed at [facility].     He was sent to the emergency department yesterday for evaluation of increasing O2 needs and confusion. However, his daughter reports that his cognitive status is at baseline currently, and she does not feel his is acutely confused. His O2 sats were dropping into the 80's, requiring up to 10L O2 to maintain sats. He was brought to the emergency department for evaluation.    Patient is unable to provide much information regarding review of systems. He denies any abdominal pain at rest, but is tender with palpation. No nausea, vomiting, diarrhea, constipation. No known fevers. His daughter is concerned his depression is worsening with his recent complex hospitalizations and recovery. The remainder review of systems is negative.    Review of Systems    The 10 point Review of Systems is negative other than noted in the HPI or here.     Past Medical History    I have reviewed this patient's medical history and updated it with pertinent information if needed.   Past Medical History:   Diagnosis Date     Intestinal infection due to Clostridium difficile 10/11/2006    Diarrhea 10/06, 1/07     Postoperative urinary retention 5/19/2019       Past Surgical History   I have reviewed this patient's surgical history and updated it with pertinent information if needed.  No past surgical history on file.    Social History   I have reviewed this patient's social history and updated it with pertinent information if needed.  Social History     Tobacco Use      Smoking status: Current Every Day Smoker     Packs/day: 1.00     Years: 45.00     Pack years: 45.00     Types: Cigarettes     Smokeless tobacco: Never Used   Substance Use Topics     Alcohol use: No     Drug use: No       Family History   I have reviewed this patient's family history and updated it with pertinent information if needed.   Family History   Problem Relation Age of Onset     Heart Disease Father      Alzheimer Disease Brother         dementia       Prior to Admission Medications   Prior to Admission Medications   Prescriptions Last Dose Informant Patient Reported? Taking?   Furosemide (LASIX) 20 MG tablet 2019 at [address] [place] No Yes   Sig: Take 1 tablet by mouth every morning.   Patient taking differently: 20 mg by Per G Tube route every morning    Heparin Sodium, Porcine, (HEPARIN, PORCINE,) 5000 UNIT/ML SOLN injection 2019 at  Nursing Home Yes Yes   Sig: Inject 5,000 Units Subcutaneous 2 times daily   Infant Foods (WATER ORAL) LIQD 2019 at Unknown time Nursing Home Yes Yes   Si mLs every 4 hours Split between G-tube and J-Tube   Nutritional Supplements (JEVITY 1.5 KERI PO) 2019 at Unknown time Nursing Home Yes Yes   Si mL/hr by Per J Tube route   acetaminophen (TYLENOL) 32 mg/mL liquid Unknown at Unknown time assisted Yes No   Si mg by Per G Tube route every 12 hours as needed for pain   acetaminophen (TYLENOL) 32 mg/mL liquid 2019 at  Nursing Home Yes Yes   Si mg by Per G Tube route 3 times daily   albuterol (PROVENTIL HFA) 108 (90 Base) MCG/ACT inhaler 2019 Nursing Home Yes Yes   Sig: Inhale 1-2 puffs into the lungs every 4 hours as needed   aspirin (ASA) 81 MG chewable tablet 2019 at  Nursing Home Yes Yes   Si mg by Per G Tube route daily    atorvastatin (LIPITOR) 80 MG tablet 2019 at  Nursing Home Yes Yes   Si mg by Enteral route daily   bisacodyl (DULCOLAX) 10 MG suppository 2019 at [facility]  Yes No   Sig: Place 10 mg rectally daily as needed for constipation   ciprofloxacin (CIPRO) 250 MG/5ML (5%) suspension dc-done  Yes No   Sig: Take by mouth 2 times daily Give 5 ml via G-Tube two times a day for UTI for  5 Days update NP with sensitivity results   doxazosin (CARDURA) 2 MG tablet 2019 at  Nursing Home Yes Yes   Si mg by Per G Tube route At Bedtime    escitalopram (LEXAPRO) 10 MG tablet 2019 at am Nursing Home Yes Yes   Sig: 10 mg by Per G Tube route daily    fish oil-omega-3 fatty acids (FISH OIL) 1000 MG capsule 2019 at [facility] Home Yes Yes   Si g by Per G Tube route 2 times daily    ipratropium - albuterol 0.5 mg/2.5 mg/3 mL (DUONEB) 0.5-2.5 (3) MG/3ML neb solution 2019 at [address] [place] [address] Yes Yes   Sig: Take 1 vial by nebulization 2 times daily    ipratropium - albuterol 0.5 mg/2.5 mg/3 mL (DUONEB) 0.5-2.5 (3) MG/3ML neb solution Unknown at Unknown time [facility] Yes No   Sig: Take 1 vial by nebulization 4 times daily For 3 days   magnesium carbonate (MAGONATE) 200 mg/ml liquid 2019 at  Nursing Home Yes Yes   Si,000 mg every morning (before breakfast) g-tube   magnesium hydroxide (MILK OF MAGNESIA) 400 MG/5ML suspension Unknown at Unknown time [facility] Yes No   Si mLs by Per G Tube route daily as needed for constipation   magnesium oxide (MAG-OX) 400 MG tablet dc wrong Nursing Home Yes No   Sig: Take 400 mg by mouth daily   melatonin 3 MG tablet 2019 at [facility] Home Yes Yes   Sig: 3 mg by Per G Tube route At Bedtime    metoprolol tartrate (LOPRESSOR) 25 MG tablet 2019 at [facility] Home Yes Yes   Si mg by Per Feeding Tube route 2 times daily    miconazole (MICATIN) 2 % AERP powder 2019 at  Nursing Home Yes Yes   Sig: Apply topically 2 times daily    mirtazapine (REMERON) 7.5 MG tablet 2019 at  Nursing Home Yes Yes   Si.5 mg by Per G Tube route daily    nitroGLYcerin (NITROSTAT) 0.4 MG sublingual tablet  Unknown at Unknown time long-term No No   Sig: For chest pain place 1 tablet under the tongue every 5 minutes for 3 doses. If symptoms persist 5 minutes after 1st dose call 911.   omeprazole (PRILOSEC) 2 mg/mL suspension 2019 at am Nursing Home Yes Yes   Si mg by Per G Tube route every morning (before breakfast)   oxyCODONE (ROXICODONE) 5 MG tablet 2019 at 1225 Nursing Home No Yes   Si-2 tablets (5-10 mg) by Per G Tube route every 4 hours as needed for severe pain   protein modular (PROSOURCE NO CARB) 2019 at am Nursing Home Yes Yes   Si packet by Per G Tube route daily PRO-STAT per NH MAR liquid, dilute with 30-60 ml of water and flush with 30-60 ml after per bottle instructions   sodium bicarbonate, antacid, POWD powder Unknown at Unknown time long-term Yes No   Sig: by Gastric Tube route as needed 650 mg as needed for occluded G-tube, dissolve in 5 ml of water   traZODone (DESYREL) 50 MG tablet Unknown at Unknown time long-term Yes No   Si mg by Per G Tube route At Bedtime       Facility-Administered Medications: None     Allergies   No Known Allergies    Physical Exam   Vital Signs: Temp: 97.4  F (36.3  C) Temp src: Oral BP: 139/75 Pulse: 93 Heart Rate: 98 Resp: 16 SpO2: 92 % O2 Device: Nasal cannula Oxygen Delivery: 4 LPM  Weight: 193 lbs 12.55 oz    Constitutional: Alert and cooperative. Understands commands and questions but unable to verbalize anything, answers by shaking head yes/no. Is ill appearing but nontoxic.    Eyes: Eyes are clear, pupils are reactive. No scleral icterus. Extroccular movements intact.     HEENT: Oropharynx is dry with mucus in the posterior oropharynx. Normocephalic, no evidence of cranial trauma.      Cardiovascular: Regular rhythm and rate, normal S1 and S2. No murmur, rubs, or gallops. Peripheral pulses intact bilaterally. No lower extremity edema.    Respiratory: Coarse lungs with rhonchi, bilateral crackles at the bases. No wheezing.      GI: Distended, tympanic to palpation. Tender to palpation of right quadrants, no rebound or guarding. No hepatosplenomegaly or masses appreciated. Diminished bowel sounds.     Musculoskeletal: Without obvious deformity, but limited range of motion. Distal CMS intact.      Skin: Warm and dry, no rashes or ecchymoses. No mottling of skin.      Neurologic: Patient moves finger and toes, but weakness with movement of larger joints. Cranial nerves partially intact (nerves III, IV, VI, V intact, difficulty bilaterally with VII, Ix, X, XI, and XII)   is symmetric. Gross strength and sensation are equal bilaterally.    Genitourinary: Box in place    Data   Data reviewed today: I reviewed all medications, new labs and imaging results over the last 24 hours. I personally reviewed the EKG tracing showing biphasic abnormal p wave but no atrial fib/flutter.    Recent Labs   Lab 07/15/19  0049 07/15/19  0045 07/12/19  1041 07/11/19  1312   WBC  --  19.9*  --  16.1*   HGB  --  9.9*  --  11.2*   MCV  --  89  --  88   PLT  --  237  --  261   INR  --  1.44*  --   --    NA  --  139 138 138   POTASSIUM  --  3.5 3.8 4.0   CHLORIDE  --  103 103 104   CO2  --  27 28 27   BUN  --  46* 36* 35*   CR  --  1.24 1.15 1.21   ANIONGAP  --  9 7 7   KERI  --  9.1 9.3 9.4   GLC  --  112* 141* 82   ALBUMIN  --  2.3*  --  2.9*   PROTTOTAL  --  7.2  --  7.7   BILITOTAL  --  0.5  --  0.5   ALKPHOS  --  326*  --  417*   ALT  --  79*  --  95*   AST  --  57*  --  68*   LIPASE  --   --   --  156   TROPI 0.074*  --   --   --      Recent Results (from the past 24 hour(s))   XR Chest 2 Views    Narrative    XR CHEST 2 VIEWS   7/15/2019 1:46 AM     HISTORY: Hypoxia and confusion.    COMPARISON: 1/3/2007.    FINDINGS: Sternal wires and mediastinal clips. No pneumothorax. The  heart is enlarged. There is no pulmonary edema. There are bibasilar  infiltrates, left greater than right. The lungs are otherwise clear.  No pleural effusion.      Impression     IMPRESSION: Bibasilar atelectasis or pneumonia.    HADLEY GUZMAN MD   CT Abdomen Pelvis w Contrast    Narrative    CT ABDOMEN/PELVIS WITH CONTRAST   7/15/2019 1:46 AM     HISTORY: Delirium, hypoxia, distended abdomen.    TECHNIQUE:  CT abdomen and pelvis with 93 mL Isovue-370 IV. Radiation  dose for this scan was reduced using automated exposure control,  adjustment of the mA and/or kV according to patient size, or iterative  reconstruction technique.    COMPARISON: 7/11/2019.    FINDINGS:    Abdomen: There is dependent atelectasis at the lung bases, increased  since the previous exam. Previous median sternotomy. The heart is  mildly enlarged. The liver has a nodular contour suggesting cirrhosis.  There are again a few small low-attenuation splenic lesions. The  gallbladder, pancreas and right adrenal gland are normal in  appearance. There is again a left adrenal gland nodule measuring up to  2.6 cm. The kidneys are within normal limits. There is extensive  atherosclerotic calcification of the aorta and its branches. 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 No aortic  dissection.    Pelvis: There is mild wall thickening of the descending and sigmoid  colon consistent with colitis, similar to the previous exam. The colon  is distended with gas and fluid. The small bowel is also distended  with gas and fluid. No evidence of obstruction. GJ tube in place.  There is a small amount of ascites in the right inguinal canal. No  free intraperitoneal gas. Catheter in the urinary bladder.  Degenerative disease in the spine.      Impression    IMPRESSION:  1. Small and large bowel are distended with gas and fluid. No evidence  of obstruction. These findings are consistent with an ileus.  2. There is again wall thickening of the descending and sigmoid colon  consistent with a nonspecific colitis.  3. Cirrhotic liver.  4. Small amount of fluid in the right inguinal canal.    HADLEY GUZMAN MD   CT Head w/o  Contrast    Narrative    CT SCAN OF THE HEAD WITHOUT CONTRAST   7/15/2019 1:19 PM     HISTORY: Altered mental status.    TECHNIQUE:  Axial images of the head and coronal reformations without  IV contrast material.  Radiation dose for this scan was reduced using  automated exposure control, adjustment of the mA and/or kV according  to patient size, or iterative reconstruction technique.    COMPARISON: 7/4/2019.    FINDINGS: Multiple old infarcts are noted. There are old infarcts in  the cerebellar hemispheres, the right genu of the corpus callosum, the  left corona radiata, the right basal ganglia region and in the right  occipital lobe. Cerebral atrophy and some chronic white matter changes  also noted. Exam is slightly limited by motion artifact. There is no  evidence for any acute infarct, acute hemorrhage, mass effect, or  skull fracture.      Impression    IMPRESSION: Chronic changes. No evidence for intracranial hemorrhage  or any acute process.

## 2019-07-15 NOTE — PLAN OF CARE
LS coarse. Oxygen sats 91% on 4L oxygen. Patient suctioned with younker. Obtained small amount of yellow drainage. Patient tolerated well. Mouth cares done. Pulsate mattress applied.

## 2019-07-15 NOTE — LETTER
Transition Communication Hand-off for Care Transitions to Next Level of Care Provider    Name: Sung Osman  : 1945  MRN #: [id number]  Primary Care Provider: Latonya Duff  Primary Care MD Name: Sherin  Primary Clinic: [facility] [address] [place] [address]  Primary Care Clinic Name: [facility]  Reason for Hospitalization:  Paralytic ileus (H) [K56.0]  Confusion [R41.0]  Hypoxia [R09.02]  Elevated troponin [R74.8]  Elevated brain natriuretic peptide (BNP) level [R79.89]  Pneumonia of both lower lobes due to infectious organism (H) [J18.1]  Admit Date/Time: 7/15/2019 12:30 AM  Discharge Date: 19  Payor Source: Payor: MEDICA / Plan: MED[employer] / Product Type: HMO /     Readmission Assessment Measure (RIKY) Risk Score/category: average    Reason for Communication Hand-off Referral: Fragility    Discharge Plan:LTC MCU       Concern for non-adherence with plan of care:   Y/N no  Discharge Needs Assessment:  Needs      Most Recent Value   Skilled Nursing Facility  [facility] [phone], Fax: [phone]        Follow-up specialty is recommended: No    Follow-up plan:  No future appointments.      Key Recommendations:  Pt is dishcharging to [facility] (Main: [phone] Admissions: [phone] Fax: [phone]) [facility] Pt has support from family but has had recent decline.     Lisa Duenas MSW, LICSW, [phone]    AVS/Discharge Summary is the source of truth; this is a helpful guide for improved communication of patient story

## 2019-07-15 NOTE — ED NOTES
Pt arrived via EMS from [facility], pt has had problems with hypoxia since 7/14  am, apparently sats were as low as 82%. Pt arrived on 10L per nc, EMS said pt did not tolerate mask well. This RN turned O2 down to 8L as sats were 95-96 %, sats dropped to 88% so O2 was increased back up to 10L. EMS said they found 10L to be the best rate to keep sats up. Pt has very wet sounding cough and crackles can be heard without stethoscope. Pt has G-J tube in place, abdomen is very distended, daughter says that is new. Bowel sounds positive x4. Pt nods or shakes his head when answering questions, speech is not clear.  Pt arrived with Box in place, draining clear yellow urine.

## 2019-07-15 NOTE — CONSULTS
Care Transition Initial Assessment - RN      Met with: Patient and Family.    DATA  Principal Problem:    Bilateral pneumonia  Active Problems:    Hyperlipidemia    Essential hypertension    Hypertrophy of prostate with urinary obstruction    Inguinal hernia    Acute respiratory failure with hypoxia (H)    Alcoholic cirrhosis (H)    CKD (chronic kidney disease) stage 2, GFR 60-89 ml/min    CAD, multiple vessel    Gastrostomy tube dependent (H)    Ischemic cardiomyopathy    Major depressive disorder, single episode, severe (H)    Oropharyngeal dysphagia    History of cerebrovascular accident with residual effects    Weakness due to acute cerebrovascular accident (CVA) (H)    Phimosis    LFT elevation    Ileus, unspecified (H)    Colitis       Cognitive Status: awake, alert and non-verbal.  [facility]: [facility]  Primary Care MD Name: Sherin  Contact information and PCP information verified: Yes  Lives With: facility resident      Quality of Family Relationships: supportive, involved, helpful  Description of Support System: Supportive, Involved   Who is your support system?: Children   Support Assessment: Adequate family and caregiver support   Insurance concerns: No Insurance issues identified        This writer met with pt, and daughter, Billie.  I introduced self and role.  Patient currently resides at [facility] (Main: [phone] Admissions: [phone] Fax: [phone]) TCU.  He transitioned there following recent hospitalizations and acute rehab at [facility].  Patient non-communicative and total cares.  Discussed discharge planning and Medicare guidelines in regards to TCU and LTC.  Billie states goal is to return [facility] upon discharge.  Confirmed bed hold in place.  Eventual goal would be for patient to transition to [facility] if able, but more likely will need LTC.  Billie requests CTS team to arrange transportation upon discharge.    PLAN    Return to TCU    Nely Wood  RN  Inpatient Care Coordinator  [facility] [phone]  [facility] [phone]

## 2019-07-16 NOTE — PLAN OF CARE
Patient received 500 ml LR bolus. Lab attempted to draw but patient refusing lab draw now. Attempted to talk patient into it but he is addiment that he does not want lab draw tonight. Updated Dr. Pappas via [facility].

## 2019-07-16 NOTE — PROGRESS NOTES
"SPIRITUAL HEALTH SERVICES  SPIRITUAL ASSESSMENT Progress Note  [facility] - Med/Surg    Pt, Sung, dealing with PN, possible ileus and just \"not feeling very good.\"  He was limited in conversation with minimal answers to questions.  After just a short time he noticed that the lab techs were at the door waiting and he pointed twice in their direction.  The visit concluded at that time.  I will be available for on-going support on request or by referral from staff.    Adalid Garcia M.A., [facility]  Staff   [facility]  Office: [phone]  Cell: [phone]  Pager [phone]    "

## 2019-07-16 NOTE — PROGRESS NOTES
Pt alert, oriented to situation, cooperative, able to make needs known, follows direction.   Denies any pain this am, bowel sounds RLQ hypoactive o/w absent, tolerated taking asa orally with small sip of water, no dysphagia noted. GT site cleaned and new dressing applied. Not using GT per order. Large incontinent loose/water brown stool, guaiac positive. Hemoglobin 11.2 on 7/11, decreased to 8.7 this am, will give 2 units PACKED RED BLOOD CELLS, troponin 0.062 this am, recheck every 6 hours. Denies short of air or chest pain. titrating  oxygen down.  Echo completed this am. Up in chair with lift this am. Aisha area slightly pink. Powder applied.   Abdomen xray completed and resulted.

## 2019-07-16 NOTE — PLAN OF CARE
Patient had large loose bowel movement. Sent specimen to lab. Patient was requesting pain med earlier for sore bottom. Dr. Sharma was updated and patient received Toradol and lidocaine patch. He has been repositioned about every 1-2 hours with mouth care provided.

## 2019-07-16 NOTE — PLAN OF CARE
Stool cultures sent/C-diff sent-all resulted negative.  Did have one inc loose BM overnight.    Alert to self and knows his birth date at times.  Will follow commands sometimes.  Did kick legs out of bed a few times overnight, bed alarm on for safety.  VSS.  Lung sounds coarse, oxygen on at 5 lpm, saturation levels in mid 90's.  IV antibiotics given per orders.

## 2019-07-16 NOTE — PLAN OF CARE
Patient alert and disorientated to time, situation, and place, although he said he was in a hospital, but did not know where. Vital signs stable. On room air and afebrile. Up with assist of 2 with lift. Pulsate mattress in use.          Second unit of blood infused. No reaction. Unable to give scheduled IV medications and antibiotics on time as patient only has 1 IV site. A second IV access site was attempted to be obtained, however was unsuccessful after 2 tries. The patient stated no more after the second try. Will administer medications when blood is complete.     Bowel sound hypoactive in all quadrants except LUQ. Patient had 1 large loose/watery incontinent stool. Aisha area sore and reddened. Aisha cares completed. Box secure and intact. GT site and dressing is clean, dry, and intact. Dressing to old trach site is clean, dry, and intact.     Tele monitor on.

## 2019-07-16 NOTE — PROGRESS NOTES
"[facility] Medicine Progress Note  Date of Service: 07/16/2019    Assessment & Plan     Sung Osman is a 74 year old male admitted on 7/15/2019. He has a complex past medical history including recent CABG, stroke, tracheostomy placement and then recannulization, recovering at [facility] and now presented to the emergency department for evaluation of new hypoxia and confusion, found to have pneumonia and an ileus.     Acute respiratory failure with hypoxia  Bilateral pneumonia - healthcare associated vs aspiration  Complex recent history with prolonged healthcare exposures, intubation, and trach s/p recannulization. Has intermittent O2 needs since his CABG, but no continuous baseline O2 needs prior to admission. Upon admission required 10L supplemental O2. Admit chest x-ray showing bibasilar atelectasis vs pneumonia. Afebrile with leukocytosis upon admission.wbc improved today . Now back on 2 liters O2  -cont zosyn,stop vanco     Colitis, possibly infectious  Ileus  Admit CT abdomen & pelvis showing \"small and large bowel are distended with gas and fluid, no small bowel obstruction but findings consistent with ileus. Nonspecific colitis - wall thickening of descending & sigmoid colon.\" c diff neg and enteric pathogens neg  -will cont flagyl with zosyn for now. Recheck abd xray and restart tube feeds slowly if ileus gone    Concern for encephalopathy vs residual effects from prior stroke  Initially there was concern for acute change in cognitive status, however daughter feels he is in his baseline cognitive state since his prior strokes.  Head CT  No acute. Pt more conversant today     LFT elevation  History of alcoholic cirrhosis  Admit alk phos 326, ALT 75, AST 57. Has known history of cirrhosis and alcoholic hepatitis, although no recent alcohol consumption. Had recent abdominal ultrasound on 7/11/19 which was unremarkable other than gallbladder sludge.lipase nl.  LFT " improving. Will check hep seologies       Anemia   hgb 8.7 today.Admit hemoglobin 9.9, recent baseline between 10-11 after surgery. No known source of bleeding, although is on SQ heparin.Will follow hgb q 6hrs. Bc of elevated trops, christiano transfuse to hgb>10. Consent given by daughter and pt also verbally agreed. Unclear cause of anemia. LFT have been elevated but improving. Will check fe studies and b12/folate/tsh and blood morph.He is guiac pos but does have coloits       CKD (chronic kidney disease) stage 2, GFR 60-89 ml/min  Admit creatinine 1.24 (baseline 1.2 - 1.5), GFR 56 (baseline 50-60). creat slight worse today but did receive a single dose of tordal yesterday. Will cont to follow    CAD, multiple vessel, s/p CABG  Ischemic cardiomyopathy  Hyperlipidemia  History of NSTEMI on 3/12/19 and CABG x 4 on 5/22/19 at [facility] with complicated post-op recovery. Most recent echo with EF 50%, mid-distal inferoseptal hypokinesis and mid-apical inferolateral hypokinesis. Admit troponin 0.074, no chest pain. Managed prior to admission with aspirin 81 mg daily, Lipitor 80 mg daily, metoprolol 25 mg bid, lasix 20 mg daily, and prn nitroglycerine.troponins have stayed fairly stable but have not normalized. Review of chart shows just a single trop that I can find just before  CABG and was at a similar level. Echo done today and pending.  Unclear if this trop is indicative of acute ischemia or if chronic trops are his baseline.  Because he continues to say he feels terrible so not clear if this could be the cause. I will tx 2 units prbc and cont to follow trops and see if they improve and if pt clinically improves    - Continue prior to admission aspirin, but use rectal administration while NPO  - Change prior to admission metoprolol to IV formulation (5 mg q 6 hours) while NPO  - Hold prior to admission Lipitor and lasix while NPO  - Continue prior to admission prn nitroglycerine  - Trend troponins     Oropharyngeal  dysphagia  Gastrostomy tube dependent (H)  Severe protein-calorie malnutrition  Feeding by G-tube  Secondary to history of recent strokes. Had complications with frequent G tube clogs at [facility]. Had been receiving protein supplements prior to admission G-tube.  - Hold administration of meds per G-tube while NPO  - Was on omeprazole for GI protection  - Consider nutrition consult      History of cerebrovascular accident with residual effects  Weakness due to acute cerebrovascular accident (CVA) (H)  History of recent strokes after CABG. Per patient's daughter he is at his baseline cognitive status (baseline since strokes) upon admission. He does not talk much at baseline. Admit EKG showing an atrial rhythm with biphasic p wave, but no atrial fibrillation/flutter. Managed prior to admission with aspirin 81 mg daily and SQ heparin 5000 U bid.   - Continue prior to admission heparin  - Continue prior to admission aspirin, but rectal while NPO     Essential hypertension  Pressures reviewed, stable. Managed prior to admission with lasix 20 mg q am and metoprolol 25 mg bid.  - Hold prior to admission lasix while NPO  - Change metoprolol to IV formulation while NPO (5 mg q 6 hours, titrate as needed)     Hypertrophy of prostate with urinary obstruction  Phimosis  Recent UTI  Had Box upon admission, recently diagnosed with UTI and has been on Cipro. Admit UA was negative for evidence of infection.   - Discontinue prior to admission Cipro  - Continue Box  - May need outpatient urology evaluation for known phimosis     Major depressive disorder, single episode, severe (H)  Patient's daughter feels his depression has been worsening since his surgery. Managed prior to admission with Lexapro 10 mg daily, Remeron 22.5 mg q hs, and melatonin.  - Hold prior to admission Lexapro, Remeron, and melatonin while NPO           Diet: NPO for Medical/Clinical Reasons Except for: Ice Chips( NPO but receiving Tube Feeding )   DVT  Prophylaxis: Heparin SQ and Pneumatic Compression Devices  Box Catheter: in place, indication: Retention  Code Status: DNR/DNI          Disposition Plan     Expected discharge: 2 - 3 days,   Emily Sharma       Interval History   Feels lousy but can't tell me why. No abd pain today. Hungry. Had large bm last pm per nurse notes. Not sob. No cp    Physical Exam   Temp:  [96.2  F (35.7  C)-98.4  F (36.9  C)] 97.3  F (36.3  C)  Pulse:  [] 93  Heart Rate:  [] 79  Resp:  [16-18] 18  BP: (114-140)/(60-78) 140/77  SpO2:  [91 %-98 %] 98 %    Weights:   Vitals:    07/15/19 0040 07/15/19 0430 07/16/19 0551   Weight: 86.2 kg (190 lb) 87.9 kg (193 lb 12.6 oz) 85.7 kg (188 lb 15 oz)    Body mass index is 25.62 kg/m .    Constitutional: nad, conversant  CV: Regular  Respiratory: CTA bilaterally  GI: Soft, nontender, + bs  Skin: Warm and dry  Musculoskeletal:no edema    Data   Recent Labs   Lab 07/16/19  0523 07/15/19  1831 07/15/19  1352 07/15/19  0049 07/15/19  0045 07/12/19  1041 07/11/19  1312   WBC 11.3*  --   --   --  19.9*  --  16.1*   HGB 8.7*  --   --   --  9.9*  --  11.2*   MCV 89  --   --   --  89  --  88     --   --   --  237  --  261   INR  --   --   --   --  1.44*  --   --      --   --   --  139 138 138   POTASSIUM 3.5  --   --   --  3.5 3.8 4.0   CHLORIDE 109  --   --   --  103 103 104   CO2 27  --   --   --  27 28 27   BUN 43*  --   --   --  46* 36* 35*   CR 1.32*  --   --   --  1.24 1.15 1.21   ANIONGAP 7  --   --   --  9 7 7   KERI 8.7  --   --   --  9.1 9.3 9.4   GLC 90  --   --   --  112* 141* 82   ALBUMIN 2.0*  --   --   --  2.3*  --  2.9*   PROTTOTAL 6.2*  --   --   --  7.2  --  7.7   BILITOTAL 0.6  --   --   --  0.5  --  0.5   ALKPHOS 236*  --   --   --  326*  --  417*   ALT 58  --   --   --  79*  --  95*   AST 44  --   --   --  57*  --  68*   LIPASE  --  66*  --   --   --   --  156   TROPI  --  0.062* 0.054* 0.074*  --   --   --        [employer]   Lab 07/16/19  0523 07/15/19  0045  07/12/19  1041 07/11/19  1312 07/10/19  1244   GLC 90 112* 141* 82 142*        Unresulted Labs Ordered in the Past 30 Days of this Admission     Date and Time Order Name Status Description    7/16/2019 0732 Hepatits C antibody In process     7/16/2019 0732 Hepatitis B surface antigen In process     7/16/2019 0732 Hepatitis A antibody IgM In process     7/15/2019 0912 Strep pneumo Agn Ur greater or equal to 13yrs or CSF any age In process     7/15/2019 0038 Blood culture Preliminary     7/15/2019 0038 Blood culture Preliminary     7/11/2019 1312 Blood culture Preliminary            Imaging  Recent Results (from the past 24 hour(s))   CT Head w/o Contrast    Narrative    CT SCAN OF THE HEAD WITHOUT CONTRAST   7/15/2019 1:19 PM     HISTORY: Altered mental status.    TECHNIQUE:  Axial images of the head and coronal reformations without  IV contrast material.  Radiation dose for this scan was reduced using  automated exposure control, adjustment of the mA and/or kV according  to patient size, or iterative reconstruction technique.    COMPARISON: 7/4/2019.    FINDINGS: Multiple old infarcts are noted. There are old infarcts in  the cerebellar hemispheres, the right genu of the corpus callosum, the  left corona radiata, the right basal ganglia region and in the right  occipital lobe. Cerebral atrophy and some chronic white matter changes  also noted. Exam is slightly limited by motion artifact. There is no  evidence for any acute infarct, acute hemorrhage, mass effect, or  skull fracture.      Impression    IMPRESSION: Chronic changes. No evidence for intracranial hemorrhage  or any acute process.    KAISER MANZO MD          Medications     lactated ringers 100 mL/hr at 07/16/19 0800     - MEDICATION INSTRUCTIONS -         aspirin  81 mg Oral or Feeding Tube Daily     heparin sodium  5,000 Units Subcutaneous BID     lidocaine  1 patch Transdermal Q24h    And     lidocaine   Transdermal Q24H    And     lidocaine    Transdermal Q8H     metoprolol  5 mg Intravenous Q6H     metroNIDAZOLE  500 mg Intravenous Q6H     miconazole   Topical BID     pantoprazole (PROTONIX) IV  40 mg Intravenous Q24H     piperacillin-tazobactam  4.5 g Intravenous Q6H     sodium chloride (PF)  10 mL Intracatheter Q8H     sodium chloride (PF)  3 mL Intracatheter Q8H     sodium chloride (PF)  3 mL Intracatheter Q8H     vancomycin (VANCOCIN) IV  1,750 mg Intravenous Q12H       Emily Sharma

## 2019-07-16 NOTE — PROGRESS NOTES
Addendum    abd xray-                                                              IMPRESSION: No evidence of free intraperitoneal air. The colon is  modestly distended with air. Gas extends to the rectum. Minimal small  bowel dilation. Findings suggest ongoing ileus, slightly improved. A  gastrojejunostomy tube is noted    Echo-Technically very difficult study despite contrast use.  Moderate aortic root dilatation 45 mm at Sinuses. Corelate with CT or MR for  more accurate sizing.  The visual ejection fraction is estimated at 55-60%. Mild inferior wall  hypokinesis cannot be ruled out.  The right ventricle is borderline dilated with borderline function    Plan-  For now cont npo with improved but ongoing ileus. Recheck abd xray tomorrow    Emily Sharma

## 2019-07-17 NOTE — PROGRESS NOTES
Pt refusing to let lab draw his midnight troponin. VSS, pt repositioned for comfort. Pt states he's not tired and wishes to have the TV left on. IV and lópez intact and patent.

## 2019-07-17 NOTE — PROGRESS NOTES
"[facility] Medicine Progress Note  Date of Service: 07/17/2019    Assessment & Plan     Sung Osman is a 74 year old male admitted on 7/15/2019. He has a complex past medical history including recent CABG, stroke, tracheostomy placement and then recannulization, [facility] and now presented to the emergency department for evaluation of new hypoxia and confusion, found to have pneumonia and an ileus.     Acute respiratory failure with hypoxia  Bilateral pneumonia - healthcare associated vs aspiration  Complex recent history with prolonged healthcare exposures, intubation, and trach s/p recannulization. Has intermittent O2 needs since his CABG, but no continuous baseline O2 needs prior to admission. Upon admission required 10L supplemental O2. Admit chest x-ray showing bibasilar atelectasis vs pneumonia. Afebrile with leukocytosis upon admission.wbc improved today . Off O2 now  -cont zosyn,stop vanco     Colitis, possibly infectious  Ileus-improved  Admit CT abdomen & pelvis showing \"small and large bowel are distended with gas and fluid, no small bowel obstruction but findings consistent with ileus. Nonspecific colitis - wall thickening of descending & sigmoid colon.\" c diff neg and enteric pathogens neg  -will cont flagyl with zosyn for now.restart tube feeds slowly if ileus gone    Concern for encephalopathy vs residual effects from prior stroke  Initially there was concern for acute change in cognitive status, however daughter feels he is in his baseline cognitive state since his prior strokes.  Head CT  No acute. Pt more conversant today     LFT elevation  History of alcoholic cirrhosis  Admit alk phos 326, ALT 75, AST 57. Has known history of cirrhosis and alcoholic hepatitis, although no recent alcohol consumption. Had recent abdominal ultrasound on 7/11/19 which was unremarkable other than gallbladder sludge.lipase nl.  LFT improving. hep seologies " negative       Anemia   hgb 8.7  7/16 .Admit hemoglobin 9.9, recent baseline between 10-11 after surgery. Transfused 2 units 7/16 bc of elevated trops and hgb today 11.4.No known source of bleeding, although is on SQ heparin.Unclear cause of anemia. LFT have been elevated but improving.  fe sats 8% and .He is guiac pos but does have colitits. B12/tsh ok.  Blood morph pending       CKD (chronic kidney disease) stage 2, GFR 60-89 ml/min  Admit creatinine 1.24 (baseline 1.2 - 1.5), GFR 56 (baseline 50-60). creat slight worse 7/16 but did receive a single dose of tordal yesterday.  7/17 down to 1.17    CAD, multiple vessel, s/p CABG  Ischemic cardiomyopathy  Hyperlipidemia  History of NSTEMI on 3/12/19 and CABG x 4 on 5/22/19 at [facility] with complicated post-op recovery. Most recent echo with EF 50%, mid-distal inferoseptal hypokinesis and mid-apical inferolateral hypokinesis. Admit troponin 0.074, no chest pain. Managed prior to admission with aspirin 81 mg daily, Lipitor 80 mg daily, metoprolol 25 mg bid, lasix 20 mg daily, and prn nitroglycerine.troponins have stayed fairly stable but have not normalized. Review of chart shows just a single trop that I can find just before  CABG and was at a similar level. Echo done  And nl lf fx.  Unclear if  Chronic  trop is indicative of acute ischemia or if chronic trops are his baseline. Trops did not improve with improvement in hgb. Discussed whether or not to pursue this abnormality with his daughter. Since he is not having any sx and ef nl, she has decided not to. She does not want him to have more procedures  - Continue prior to admission aspirin, but use rectal administration while NPO  - Change prior to admission metoprolol to IV formulation (5 mg q 6 hours) while NPO  - Hold prior to admission Lipitor and lasix while NPO  - Continue prior to admission prn nitroglycerine       Oropharyngeal dysphagia  Gastrostomy tube dependent (H)  Severe protein-calorie  malnutrition  Feeding by G-tube- restart tube feeds slowly today  Secondary to history of recent strokes. Had complications with frequent G tube clogs at [facility]. Had been receiving protein supplements prior to admission G-tube.  - Hold administration of meds per G-tube while NPO  - Was on omeprazole for GI protection       History of cerebrovascular accident with residual effects  Weakness due to acute cerebrovascular accident (CVA) (H)  History of recent strokes after CABG. Per patient's daughter he is at his baseline cognitive status (baseline since strokes) upon admission. He does not talk much at baseline. Admit EKG showing an atrial rhythm with biphasic p wave, but no atrial fibrillation/flutter. Managed prior to admission with aspirin 81 mg daily and SQ heparin 5000 U bid.   - Continue prior to admission heparin  - Continue prior to admission aspirin, but rectal while NPO     Essential hypertension  Pressures reviewed, stable. Managed prior to admission with lasix 20 mg q am and metoprolol 25 mg bid.  - Hold prior to admission lasix while NPO  - Change metoprolol to IV formulation while NPO (5 mg q 6 hours, titrate as needed)     Hypertrophy of prostate with urinary obstruction  Phimosis  Recent UTI  Had Box upon admission, recently diagnosed with UTI and has been on Cipro. Admit UA was negative for evidence of infection.   - Discontinue prior to admission Cipro  - Continue Box  - May need outpatient urology evaluation for known phimosis     Major depressive disorder, single episode, severe (H)  Patient's daughter feels his depression has been worsening since his surgery. Managed prior to admission with Lexapro 10 mg daily, Remeron 22.5 mg q hs, and melatonin.  - Hold prior to admission Lexapro, Remeron, and melatonin while NPO. Restart once tolerating tube feeds           Diet: restart tibe feeds  DVT Prophylaxis: Heparin SQ and Pneumatic Compression Devices  Box Catheter: in place, indication:  Retention  Code Status: DNR/DNI          Disposition Plan     Expected discharge: possible discharge tomorrow   Emily Sharma       Interval History   Pt smiling when I entered room. When asked how he was feeling he said lousy. When asked why he had to think about it and then said bc of his diarrhea. Nursing says he has not been having diarrhea, NO cp, or sob. NO abd pain and hungry    Physical Exam   Temp:  [97.4  F (36.3  C)-98.4  F (36.9  C)] 97.4  F (36.3  C)  Pulse:  [65-85] 70  Heart Rate:  [77-87] 79  Resp:  [16-18] 16  BP: (122-145)/(66-80) 122/66  SpO2:  [92 %-98 %] 94 %    Weights:   Vitals:    07/15/19 0040 07/15/19 0430 07/16/19 0551   Weight: 86.2 kg (190 lb) 87.9 kg (193 lb 12.6 oz) 85.7 kg (188 lb 15 oz)    Body mass index is 25.62 kg/m .    Constitutional: nad, conversant  CV: Regular  Respiratory: CTA bilaterally  GI: Soft, nontender, + bs  Skin: Warm and dry  Musculoskeletal:no edema    Data   Recent Labs   Lab 07/17/19  0616 07/16/19  1849 07/16/19  1256 07/16/19  0523 07/15/19  1831  07/15/19  0045  07/11/19  1312   WBC  --   --  9.9 11.3*  --   --  19.9*  --  16.1*   HGB 11.4*  --  9.3* 8.7*  --   --  9.9*  --  11.2*   MCV  --   --  90 89  --   --  89  --  88   PLT  --   --  265 238  --   --  237  --  261   INR  --   --   --   --   --   --  1.44*  --   --      --   --  143  --   --  139   < > 138   POTASSIUM 3.2*  --   --  3.5  --   --  3.5   < > 4.0   CHLORIDE 114*  --   --  109  --   --  103   < > 104   CO2 21  --   --  27  --   --  27   < > 27   BUN 31*  --   --  43*  --   --  46*   < > 35*   CR 1.17  --   --  1.32*  --   --  1.24   < > 1.21   ANIONGAP 9  --   --  7  --   --  9   < > 7   KERI 8.6  --   --  8.7  --   --  9.1   < > 9.4   GLC 78  --   --  90  --   --  112*   < > 82   ALBUMIN 2.0*  --   --  2.0*  --   --  2.3*  --  2.9*   PROTTOTAL 6.3*  --   --  6.2*  --   --  7.2  --  7.7   BILITOTAL 1.1  --   --  0.6  --   --  0.5  --  0.5   ALKPHOS 241*  --   --  236*  --   --  326*  --   417*   ALT 47  --   --  58  --   --  79*  --  95*   AST 42  --   --  44  --   --  57*  --  68*   LIPASE  --   --   --   --  66*  --   --   --  156   TROPI 0.071* 0.073* 0.067*  --  0.062*   < >  --   --   --     < > = values in this interval not displayed.       Recent Labs   Lab 07/17/19  0616 07/16/19  0523 07/15/19  0045 07/12/19  1041 07/11/19  1312 07/10/19  1244   GLC 78 90 112* 141* 82 142*        Unresulted Labs Ordered in the Past 30 Days of this Admission     Date and Time Order Name Status Description    7/17/2019 0001 Folate In process     7/16/2019 1059 Blood Morphology Pathologist Review In process     7/15/2019 0038 Blood culture Preliminary     7/15/2019 0038 Blood culture Preliminary            Imaging  Recent Results (from the past 24 hour(s))   Abdomen 2 - 3 View*    Narrative    ABDOMEN TWO VIEWS 7/16/2019 11:21 AM     HISTORY: Evaluate ileus.    COMPARISON: 7/15/2019      Impression    IMPRESSION: No evidence of free intraperitoneal air. The colon is  modestly distended with air. Gas extends to the rectum. Minimal small  bowel dilation. Findings suggest ongoing ileus, slightly improved. A  gastrojejunostomy tube is noted.    YULIA LEOS MD   Abdomen 2 - 3 View*    Narrative    ABDOMEN TWO VIEWS  7/17/2019 8:59 AM     HISTORY:  Ileus.    COMPARISON: 7/16/2019      Impression    IMPRESSION: Mild gaseous distention of the colon improved compared to  prior. No evidence of free air or organomegaly. A gastrojejunostomy  tube is noted.    YULIA LEOS MD          Medications     lactated ringers 100 mL/hr at 07/16/19 1901     - MEDICATION INSTRUCTIONS -         aspirin  81 mg Oral or Feeding Tube Daily     heparin sodium  5,000 Units Subcutaneous BID     lidocaine  1 patch Transdermal Q24h    And     lidocaine   Transdermal Q24H    And     lidocaine   Transdermal Q8H     metoprolol  5 mg Intravenous Q6H     metroNIDAZOLE  500 mg Intravenous Q6H     miconazole   Topical BID     pantoprazole  (PROTONIX) IV  40 mg Intravenous BID     piperacillin-tazobactam  4.5 g Intravenous Q6H     sodium chloride (PF)  10 mL Intracatheter Q8H       Emily Sharma

## 2019-07-17 NOTE — CONSULTS
"CLINICAL NUTRITION SERVICES  -  ASSESSMENT NOTE     REASON FOR ASSESSMENT  Sung Osman is a 74 year old male seen by Registered Dietitian for Admission Nutrition Risk Screen for reduced oral intake over the last month and Provider Order - Registered Dietitian to order TF per Medical Nutrition Therapy Guidelines, restart tube feeds slowly      NUTRITION HISTORY  The patient is NPO, he receives tube feeding at nursing home. Patient's family member reports that the patient has been receiving a tube feeding since early May. Per the nursing home medication list in the paper chart, the patient receives a continuous tube feeding via J tube of Jevity 1.5 at 60 mL/hr with water flushes of 200 mL split between GT and JT every 4 hours.      CURRENT NUTRITION ORDERS  Diet Order:     NPO      NUTRITION FOCUSED PHYSICAL ASSESSMENT (NFPA)  Completed:  Yes Visual assessment only         Observed:    No nutrition-related physical findings observed    Obtained from Chart/Interdisciplinary Team:  none    ANTHROPOMETRICS  Height: 6' 0\"  Weight: 188 lbs 14.95 oz  Body mass index is 25.62 kg/m .  Weight Status:  Overweight BMI 25-29.9  IBW: 178#s  % IBW: 106%  Weight History:   Wt Readings from Last 10 Encounters:   07/16/19 85.7 kg (188 lb 15 oz)   07/14/19 83.9 kg (185 lb)   07/11/19 81.6 kg (180 lb)   07/10/19 84.4 kg (186 lb)   07/08/19 83.7 kg (184 lb 8 oz)   06/26/12 102.6 kg (226 lb 3.2 oz)   06/09/11 106.1 kg (234 lb)   01/26/10 105.7 kg (233 lb)   02/04/09 103 kg (227 lb)   09/22/08 103 kg (227 lb)         LABS  Potassium 7/16 3.5, today 7/17 it is low at 3.2 and is being replaced    MEDICATIONS  Medications reviewed      ASSESSED NUTRITION NEEDS PER APPROVED PRACTICE GUIDELINES:    Dosing Weight 86 kg  Estimated Energy Needs: 0171-2984 kcals (25-30 Kcal/Kg)  Justification: maintenance  Estimated Protein Needs:  grams protein (1-1.2 g pro/Kg)  Justification: Repletion and preservation of lean body mass  Estimated " Fluid Needs:1 mL/Kcal  Justification: maintenance    MALNUTRITION:  % Weight Loss:  None noted  % Intake:  No decreased intake noted  Subcutaneous Fat Loss:  None observed  Muscle Loss:  None observed  Fluid Retention:  No edema per the provider note    Malnutrition Diagnosis: Patient does not meet two of the above criteria necessary for diagnosing malnutrition    NUTRITION DIAGNOSIS:  No nutrition diagnosis identified at this time      NUTRITION INTERVENTIONS  Recommendations / Nutrition Prescription  Tube feeding via J tube to be restarted slowly today per physician request. Isosource 1.5 is the [employer] substitution for Jevity 1.5. Tube feeding of Isosource 1.5 started at 20 mL/hr to increase by 10 mL every 12 hours until the goal rate of 60 mL/hr is reached. IV fluids currently at 100 mL/hr, ordered water flush of 30 mL every 4 hours via J tube. When IV fluids are discontinued, will need to order the patient's usual water flush volume. Isosource 1.5 at 60 mL/hr will provide 2160 calories, 98 grams of protein, 22 grams of protein, and 1094 mL of free water.  .      Implementation  Nutrition education: No education needs assessed at this time  EN Composition, EN Schedule and Feeding Tube Flush  .      Nutrition Goals  Patient to tolerate slow restart of tube feeding with goal of 60 mL/hr.  .      MONITORING AND EVALUATION:  Progress towards goals will be monitored and evaluated per protocol and Practice Guidelines and Enteral Nutrition intake    Gaby Camacho RD,LD  Clinical Dietitian

## 2019-07-17 NOTE — PROVIDER NOTIFICATION
Provider Notification     Message: 2208 H[address] T. - Patient's son is here and wouldn't mind an update on the patient if you have time. Patient's daughter should be here around 1630 as well.      Zachary paged at [phone].

## 2019-07-17 NOTE — RESULT ENCOUNTER NOTE
Final blood culture report is NEGATIVE.    No treatment or change in treatment per [facility] Lab Result protocol.

## 2019-07-17 NOTE — PROGRESS NOTES
ADDENDUM 7/17/19 @ 1435:    Angelina from [facility] called to notify that patient's son cancelled the bed hold and that patient has now been accepted at [facility] in a Memory Care bed. Angelina also states that family requested information on financial assistance.  Talked with patient about discharge plans and with permission reached out to family requesting call back as unable to connect. Requested Patient Financial Counselor reach out to family to inquire about any financial concerns.    Care transitions will continue to follow.    Kassy Prasad DNP, RN Care Coordinator   [facility] [phone]/[facility] [phone]       Reason for Follow up: Discharge Planning    Anticipated discharge needs: Patient accepted at [facility] at discharge.  Per rounds, patient is not ready to discharge today. [facility] updated with new anticipated discharge date.    Next steps: Care transitions to follow for discharge planning.    Discharge Planner   Discharge Plans in progress: [facility]  Barriers to discharge plan: Medical stability  Follow up plan: Care transitions to follow.       Entered by: Kassy Prasad 07/17/2019 10:12 AM       Kassy Prasad DNP, RN, CNE, PHN  RN Care Coordinator  [facility] [phone]  [facility] [phone]

## 2019-07-18 NOTE — PROGRESS NOTES
"[facility] Medicine Progress Note  Date of Service: 07/18/2019    Assessment & Plan     Sung Osman is a 74 year old male admitted on 7/15/2019. He has a complex past medical history including recent CABG, stroke, tracheostomy placement and then recannulization, recovering at [facility] and now presented to the emergency department for evaluation of new hypoxia and confusion, found to have pneumonia and an ileus.     Acute respiratory failure with hypoxia  Bilateral pneumonia - healthcare associated vs aspiration  Complex recent history with prolonged healthcare exposures, intubation, and trach s/p recannulization. Has intermittent O2 needs since his CABG, but no continuous baseline O2 needs prior to admission. Upon admission required 10L supplemental O2. Admit chest x-ray showing bibasilar atelectasis vs pneumonia. Afebrile with leukocytosis upon admission.wbc improved today . Off O2 now  -cont zosyn,stop vanco     Colitis, possibly infectious  Ileus-improved  Admit CT abdomen & pelvis showing \"small and large bowel are distended with gas and fluid, no small bowel obstruction but findings consistent with ileus. Nonspecific colitis - wall thickening of descending & sigmoid colon.\" c diff neg and enteric pathogens neg  -will cont flagyl with zosyn for now.restart tube feeds slowly if ileus gone    Concern for encephalopathy vs residual effects from prior stroke  Initially there was concern for acute change in cognitive status, however daughter feels he is in his baseline cognitive state since his prior strokes.  Head CT  No acute. Pt more conversant today     LFT elevation  History of alcoholic cirrhosis  Admit alk phos 326, ALT 75, AST 57. Has known history of cirrhosis and alcoholic hepatitis, although no recent alcohol consumption. Had recent abdominal ultrasound on 7/11/19 which was unremarkable other than gallbladder sludge.lipase nl.  LFT improving. hep seologies " negative       Anemia   hgb 8.7  7/16 .Admit hemoglobin 9.9, recent baseline between 10-11 after surgery. Transfused 2 units 7/16 bc of elevated trops and hgb today 11.4.No known source of bleeding, although is on SQ heparin.Unclear cause of anemia. LFT have been elevated but improving.  fe sats 8% and .He is guiac pos but does have colitits. B12/tsh ok.  Blood morph pending     hypokalemia- replace    Hypernatremia- increase free h2o per ft, stop ivf    CKD (chronic kidney disease) stage 2, GFR 60-89 ml/min  Admit creatinine 1.24 (baseline 1.2 - 1.5), GFR 56 (baseline 50-60). creat slight worse 7/16 but did receive a single dose of tordal yesterday.  7/17 down to 1.17    CAD, multiple vessel, s/p CABG  Ischemic cardiomyopathy  Hyperlipidemia  History of NSTEMI on 3/12/19 and CABG x 4 on 5/22/19 at [facility] with complicated post-op recovery. Most recent echo with EF 50%, mid-distal inferoseptal hypokinesis and mid-apical inferolateral hypokinesis. Admit troponin 0.074, no chest pain. Managed prior to admission with aspirin 81 mg daily, Lipitor 80 mg daily, metoprolol 25 mg bid, lasix 20 mg daily, and prn nitroglycerine.troponins have stayed fairly stable but have not normalized. Review of chart shows just a single trop that I can find just before  CABG and was at a similar level. Echo done  And nl lf fx.  Unclear if  Chronic  trop is indicative of acute ischemia or if chronic trops are his baseline. Trops did not improve with improvement in hgb. Discussed whether or not to pursue this abnormality with his daughter. Since he is not having any sx and ef nl, she has decided not to. She does not want him to have more procedures  - Continue prior to admission aspirin  - restart pta lopressor per ft  - Hold prior to admission lasix   - Continue prior to admission prn nitroglycerine  -restart lipitor       Oropharyngeal dysphagia  Gastrostomy tube dependent (H)  Severe protein-calorie malnutrition  Feeding by G-tube-  restart tube feeds slowly today  Secondary to history of recent strokes. Had complications with frequent G [facility] clogs [facility]. Had been receiving protein supplements prior to admission G-tube.    - restart per ft omeprazole for GI protection  -restarted ft 7/17 with goal og getting to 60cc per hour, restart supplements when we know he is tolerating feed tube       History of cerebrovascular accident with residual effects  Weakness due to acute cerebrovascular accident (CVA) (H)  History of recent strokes after CABG. Per patient's daughter he is at his baseline cognitive status (baseline since strokes) upon admission. He does not talk much at baseline. Admit EKG showing an atrial rhythm with biphasic p wave, but no atrial fibrillation/flutter. Managed prior to admission with aspirin 81 mg daily and SQ heparin 5000 U bid.   - Continue prior to admission heparin  - Continue prior to admission aspirin     Essential hypertension  Pressures reviewed, stable. Managed prior to admission with lasix 20 mg q am and metoprolol 25 mg bid.  - Hold prior to admission lasix   - Change metoprolol  Back to per feed tube     Hypertrophy of prostate with urinary obstruction  Phimosis  Recent UTI  Had Box upon admission, recently diagnosed with UTI and has been on Cipro. Admit UA was negative for evidence of infection.   - Discontinue prior to admission Cipro  - Continue Box  - May need outpatient urology evaluation for known phimosis     Major depressive disorder, single episode, severe (H)  Patient's daughter feels his depression has been worsening since his surgery. Managed prior to admission with Lexapro 10 mg daily, Remeron 22.5 mg q hs, and melatonin.  - Hold prior to admission  Remeron, and melatonin . Restart  lexapro today           Diet: restart tibe feeds  DVT Prophylaxis: Heparin SQ and Pneumatic Compression Devices  Box Catheter: in place, indication: Retention  Code Status: DNR/DNI          Disposition Plan      Expected discharge: possible discharge tomorrow , once we know tolerating tube feeds full dose and meds per ft  Emily Sharma       Interval History   Pt feeling good. Has no c/o. Denies pain, sob    Physical Exam   Temp:  [97.1  F (36.2  C)-99.3  F (37.4  C)] 97.8  F (36.6  C)  Pulse:  [69-83] 73  Heart Rate:  [74-79] 79  Resp:  [18] 18  BP: (127-156)/(65-80) 140/66  SpO2:  [94 %-97 %] 95 %ra    Weights:   Vitals:    07/15/19 0430 07/16/19 0551 07/18/19 0331   Weight: 87.9 kg (193 lb 12.6 oz) 85.7 kg (188 lb 15 oz) 86.1 kg (189 lb 13.1 oz)    Body mass index is 25.74 kg/m .    Constitutional: nad, conversant  CV: Regular  Respiratory: CTA bilaterally  GI: Soft, nontender, + bs  Skin: Warm and dry  Musculoskeletal:no edema    Data   Recent Labs   Lab 07/18/19  0622 07/17/19  1639 07/17/19  0616 07/16/19  1849 07/16/19  1256 07/16/19  0523 07/15/19  1831  07/15/19  0045  07/11/19  1312   WBC  --   --   --   --  9.9 11.3*  --   --  19.9*  --  16.1*   HGB  --   --  11.4*  --  9.3* 8.7*  --   --  9.9*  --  11.2*   MCV  --   --   --   --  90 89  --   --  89  --  88   PLT  --   --   --   --  265 238  --   --  237  --  261   INR  --   --   --   --   --   --   --   --  1.44*  --   --    *  --  144  --   --  143  --   --  139   < > 138   POTASSIUM 3.3* 3.5 3.2*  --   --  3.5  --   --  3.5   < > 4.0   CHLORIDE 115*  --  114*  --   --  109  --   --  103   < > 104   CO2 23  --  21  --   --  27  --   --  27   < > 27   BUN 26  --  31*  --   --  43*  --   --  46*   < > 35*   CR 1.16  --  1.17  --   --  1.32*  --   --  1.24   < > 1.21   ANIONGAP 8  --  9  --   --  7  --   --  9   < > 7   KERI 8.4*  --  8.6  --   --  8.7  --   --  9.1   < > 9.4   *  --  78  --   --  90  --   --  112*   < > 82   ALBUMIN  --   --  2.0*  --   --  2.0*  --   --  2.3*  --  2.9*   PROTTOTAL  --   --  6.3*  --   --  6.2*  --   --  7.2  --  7.7   BILITOTAL  --   --  1.1  --   --  0.6  --   --  0.5  --  0.5   ALKPHOS  --   --  241*  --   --   236*  --   --  326*  --  417*   ALT  --   --  47  --   --  58  --   --  79*  --  95*   AST  --   --  42  --   --  44  --   --  57*  --  68*   LIPASE  --   --   --   --   --   --  66*  --   --   --  156   TROPI  --   --  0.071* 0.073* 0.067*  --  0.062*   < >  --   --   --     < > = values in this interval not displayed.       Recent Labs   Lab 07/18/19  0622 07/17/19  0616 07/16/19  0523 07/15/19  0045 07/12/19  1041 07/11/19  1312   * 78 90 112* 141* 82        Unresulted Labs Ordered in the Past 30 Days of this Admission     Date and Time Order Name Status Description    7/16/2019 1059 Blood Morphology Pathologist Review In process     7/15/2019 0038 Blood culture Preliminary     7/15/2019 0038 Blood culture Preliminary            Imaging  Recent Results (from the past 24 hour(s))   Abdomen 2 - 3 View*    Narrative    ABDOMEN TWO VIEWS  7/17/2019 8:59 AM     HISTORY:  Ileus.    COMPARISON: 7/16/2019      Impression    IMPRESSION: Mild gaseous distention of the colon improved compared to  prior. No evidence of free air or organomegaly. A gastrojejunostomy  tube is noted.    YULIA LEOS MD          Medications     IV fluid REPLACEMENT ONLY       - MEDICATION INSTRUCTIONS -         aspirin  81 mg Oral or Feeding Tube Daily     atorvastatin  80 mg Per Feeding Tube QPM     doxazosin  2 mg Per Feeding Tube At Bedtime     escitalopram  10 mg Per Feeding Tube Daily     heparin sodium  5,000 Units Subcutaneous BID     [START ON 7/19/2019] LANsoprazole  15 mg Per Feeding Tube QAM AC     lidocaine  1 patch Transdermal Q24h    And     lidocaine   Transdermal Q24H    And     lidocaine   Transdermal Q8H     metoprolol tartrate  25 mg Per Feeding Tube BID     metroNIDAZOLE  500 mg Intravenous Q6H     miconazole   Topical BID     piperacillin-tazobactam  4.5 g Intravenous Q6H     sodium chloride (PF)  10 mL Intracatheter Q8H       Emily Sharma

## 2019-07-18 NOTE — PLAN OF CARE
Patient denies pain.  Sats 95% on room air.  Tolerating tube feeding at 40 ml/hr.  Increased rate due at 2000.  Had 2 loose, incontinent stools this shift.  Up in chair with use of lift.

## 2019-07-18 NOTE — PROGRESS NOTES
NUTRITION NOTE    -Plan is for pt to discharge back to nursing home tomorrow morning  -Pt is tolerating TF well   -TF rate increased to 40 mL/hr and will increase by 10 mL Q 8 hrs to reach goal rate of 60 mL tomorrow morning  -IVF have been discontinued. Fluid flushes increased to 200 mL Q 4 hrs, which is pt's usual fluid flush regimen at nursing home, and provides 1200 mL fluid/day. TF of Isosource 1.5 is Isosource 1.5 at 60 mL/hr provides 2160 calories (100% estimated needs), 98 grams of protein (100% estimated needs), and 1094 mL of free water. Total water of fluid flushes and TF formula provides 2294 mL/day, which meets 100% estimated needs.     ASSESSED NUTRITION NEEDS PER APPROVED PRACTICE GUIDELINES:     Dosing Weight 86 kg (actual wt)  Estimated Energy Needs: 6873-7055 kcals (25-30 Kcal/Kg)  Justification: maintenance  Estimated Protein Needs:  grams protein (1-1.2 g pro/Kg)  Justification: Repletion and preservation of lean body mass  Estimated Fluid Needs:1 mL/Kcal  Justification: maintenance        Ninfa Pozo RD, LD  Clinical Dietitian  [facility]: [phone]  [facility]: [phone]

## 2019-07-18 NOTE — PLAN OF CARE
Pt alert to self. Assist of 2, lift for transfers. Box in place and patent. Denies pain, nausea, SOB. IV antibiotics given overnight. LR infusing at 100 ml/hr. Tube feeding infusing at 30 ml/hr. Pt tolerating well. Telemetry monitoring in place. Pt has infrequent weak cough. Rested comfortably overnight. /80 (BP Location: Right arm)   Pulse 71   Temp 97.1  F (36.2  C) (Oral)   Resp 18   Ht 1.829 m (6')   Wt 86.1 kg (189 lb 13.1 oz)   SpO2 97%   BMI 25.74 kg/m

## 2019-07-18 NOTE — PLAN OF CARE
"Patient alert and only orientated to self. Patient is pleasant and no bed alarms were set off this shift. He was up in the chair once with assist of 2 with the lift. Pulsate mattress in use.      Patient denies pain when asked, however during repositioning patient will say \" ow ow ow\". Patient had 1 incontinent BM today. Barrier cream applied to sore maryellen area. Box secure and intact.      Tube feeding started at 20mL/hr via J tube. G tube clamped. Dressing to site is clean, dry, and intact. NPO diet.      Potassium replaced per protocol. Recheck value was 3.5.     Tele monitor on. Vital signs stable. On room air and afebrile. Patient continues to have a weak congested cough. Scheduled IV antibiotics given. IV fluids running.      "

## 2019-07-18 NOTE — PROGRESS NOTES
Reason for Follow up: DC Planning    Anticipated discharge needs: This writer spoke with dtr, Alyssa, her and all family are in agreement for pt to discharge when stable to [facility] (Main: [phone] Admissions: [phone] Fax: [phone]) [facility].     Transport to be arranged on discharge by CTS.     Next steps: DC to [facility] when stable    Discharge Planner   [facility]  Barriers to discharge plan: medical stability  Follow up plan: CTS to follow       Entered by: Lisa Duenas 07/18/2019 11:27 AM           Lisa Duenas MSW, LICSW, [facility] [phone]

## 2019-07-18 NOTE — PROGRESS NOTES
Patient has been up in chair watching tv. Patient has no c/o pain. Patient is alert mostly to self. Patient is a lift staff had hard time moving patient when patient was standing. Call light with reach althought patient can't use. Chair alarm activated.

## 2019-07-19 NOTE — PROGRESS NOTES
Care Transitions Discharge:    Name: Sung Osman    MRN: [id number]    Reason for Hospitalization: Paralytic ileus (H) [K56.0]  Confusion [R41.0]  Hypoxia [R09.02]  Elevated troponin [R74.8]  Elevated brain natriuretic peptide (BNP) level [R79.89]  Pneumonia of both lower lobes due to infectious organism (H) [J18.1]    Cognitive/Behavioral Status: awake and alert    Follow-up Appointments: No future appointments.    Discharge Date:  7/19/2019    Patient/Care Partner in agreement and understands the discharge plan:  Yes    Discharge Disposition:  [facility] (Main: [phone] Admissions: [phone] Fax: [phone]).  Transportation will be provided by [facility] at 1530.    Discharge Planner   [facility]  Barriers to discharge plan: None  Follow up plan: Follow up with PCP.       Entered by: Stacy Judd 07/19/2019 11:25 AM         Stacy Judd RN Care Coordinator  [phone]

## 2019-07-19 NOTE — DISCHARGE SUMMARY
"[facility]    Discharge Summary  Hospital Medicine    Date of Admission:  7/15/2019  Date of Discharge:  7/19/2019   Discharging Provider: Emily Sharma  Date of Service: 7/19/2019     Primary Care     Latonya Duff  [facility] [address] [address] [facility] [address]    Assessment & Plan     Sung Osman is a 74 year old male admitted on 7/15/2019. He has a complex past medical history including recent CABG, stroke, tracheostomy placement and then recannulization, recovering at [facility] and now presented to the emergency department for evaluation of new hypoxia and confusion, found to have pneumonia and an ileus.     Acute respiratory failure with hypoxia  Bilateral pneumonia - healthcare associated vs aspiration-improved  Complex recent history with prolonged healthcare exposures, intubation, and trach s/p recannulization. Has intermittent O2 needs since his CABG, but no continuous baseline O2 needs prior to admission. Upon admission required 10L supplemental O2. Admit chest x-ray showing bibasilar atelectasis vs pneumonia. Afebrile with leukocytosis upon admission.wbc improved . Off O2 now   Has been on zosyn and will switch to augmentin to complete outpt course     Colitis, possibly infectious  Ileus-improved  Admit CT abdomen & pelvis showing \"small and large bowel are distended with gas and fluid, no small bowel obstruction but findings consistent with ileus. Nonspecific colitis - wall thickening of descending & sigmoid colon.\" c diff neg and enteric pathogens neg  -has been on flagyl and will cont to complete course     Concern for encephalopathy vs residual effects from prior stroke  Initially there was concern for acute change in cognitive status, however daughter feels he is in his baseline cognitive state since his prior strokes.  Head CT  No acute. Pt more conversant by discharge       LFT elevation  History of alcoholic cirrhosis  Admit alk phos 326, ALT 75, " AST 57. Has known history of cirrhosis and alcoholic hepatitis, although no recent alcohol consumption. Had recent abdominal ultrasound on 7/11/19 which was unremarkable other than gallbladder sludge.lipase nl.  LFT improving. hep serlogies negative        Anemia   hgb 8.7  7/16 .Admit hemoglobin 9.9, recent baseline between 10-11 after surgery. Transfused 2 units 7/16 bc of elevated trops and hgb today low.after tx 11.4.No known source of bleeding, although is on SQ heparin.Unclear cause of anemia. LFT have been elevated but improving.  fe sats 8% and He is guiac pos but does have colitits. B12/tsh ok.  Blood morph  Just showed rouleaux which could be from infection. Not clear that family would want an aggressive eval      hypokalemia- replaced     Hypernatremia- resolved-increased free h2o per ft     CKD (chronic kidney disease) stage 2, GFR 60-89 ml/min  Admit creatinine 1.24 (baseline 1.2 - 1.5), GFR 56 (baseline 50-60). creat slight worse 7/16 but did receive a single dose of tordal .  7/17 down to 1.17     CAD, multiple vessel, s/p CABG  Ischemic cardiomyopathy  Hyperlipidemia  History of NSTEMI on 3/12/19 and CABG x 4 on 5/22/19 at [facility] with complicated post-op recovery. Most recent echo with EF 50%, mid-distal inferoseptal hypokinesis and mid-apical inferolateral hypokinesis. Admit troponin 0.074, no chest pain. Managed prior to admission with aspirin 81 mg daily, Lipitor 80 mg daily, metoprolol 25 mg bid, lasix 20 mg daily, and prn nitroglycerine.troponins have stayed fairly stable but have not normalized. Review of chart shows just a single trop that I can find just before  CABG and was at a similar level. Echo done  And nl lf fx.  Unclear if  Chronic  trop is indicative of acute ischemia or if chronic trops are his baseline. Trops did not improve with improvement in hgb. Discussed whether or not to pursue this abnormality with his daughter. Since he is not having any sx and ef nl, she has decided not  to. She does not want him to have more procedures          Oropharyngeal dysphagia  Gastrostomy tube dependent (H)  Severe protein-calorie malnutrition  Feeding by G-tube- restart tube feeds slowly today  Secondary to history of recent strokes. Had complications with frequent G tube clogs at [facility].  Once ileus resolved, we were able to restart tube feeds             History of cerebrovascular accident with residual effects  Weakness due to acute cerebrovascular accident (CVA) (H)  History of recent strokes after CABG. Per patient's daughter he is at his baseline cognitive status (baseline since strokes) upon admission. He does not talk much at baseline. Admit EKG showing an atrial rhythm with biphasic p wave, but no atrial fibrillation/flutter. Managed prior to admission with aspirin 81 mg daily and SQ heparin 5000 U bid.        Essential hypertension  Pressures reviewed, stable. Managed prior to admission with lasix 20 mg q am and metoprolol 25 mg bid.Was not on lasix during hosp stay and this will be restarted at discharge         Hypertrophy of prostate with urinary obstruction  Phimosis  Recent UTI  Had Box upon admission, recently diagnosed with UTI and has been on Cipro. Admit UA was negative for evidence of infection.     - May need outpatient urology evaluation for known phimosis     Major depressive disorder, single episode, severe (H)  Patient's daughter feels his depression has been worsening since his surgery. Managed prior to admission with Lexapro 10 mg daily, Remeron 22.5 mg q hs, and melatonin.  - Hold prior to admission  Remeron, and melatonin . Restart  lexapro today  Discharge Disposition   To [facility] LT    Discharge Orders      General info for SNF    Length of Stay Estimate: Long Term Care  Condition at Discharge: Improving  Level of care:skilled   Rehabilitation Potential: Fair  Admission H&P remains valid and up-to-date: Yes  Recent Chemotherapy: N/A  Use Nursing Home Standing Orders: Yes      Mantoux instructions    Give two-step Mantoux (PPD) Per Facility Policy     Activity - Up with nursing assistance     Follow Up and recommended labs and tests    Should be seen by md 1 week. Will need bmp and hgb 1 week     Physical Therapy Adult Consult    Evaluate and treat as clinically indicated.    Reason: weak     Occupational Therapy Adult Consult    Evaluate and treat as clinically indicated.    Reason:  weak     Speech Language Path Adult Consult    Evaluate and treat as clinically indicated.    Reason:  weak     Adult Formula Bolus Feeding    Tube feeding-Fluid flushes increased to 200 mL Q 4 hrs, which is pt's usual fluid flush regimen at nursing home, and provides 1200 mL fluid/day. TF of Isosource 1.5 is Isosource 1.5 at 60 mL/hr provides 2160 calories (100% estimated needs), 98 grams of protein (100% estimated needs), and 1094 mL of free water. Total water of fluid flushes and TF formula provides 2294 mL/day, which meets 100% estimated needs     Discharge Medications   Current Discharge Medication List      START taking these medications    Details   amoxicillin-clavulanate (AUGMENTIN) 250-62.5 MG/5ML suspension Take 17.5 mLs (875 mg) by mouth 2 times daily for 3 days    Associated Diagnoses: Pneumonia of both lower lobes due to infectious organism (H)      metroNIDAZOLE (FLAGYL) 500 MG tablet Take 1 tablet (500 mg) by mouth 3 times daily for 4 days  Qty: 12 tablet, Refills: 0    Associated Diagnoses: Colitis         CONTINUE these medications which have NOT CHANGED    Details   !! acetaminophen (TYLENOL) 32 mg/mL liquid 650 mg by Per G Tube route 3 times daily      albuterol (PROVENTIL HFA) 108 (90 Base) MCG/ACT inhaler Inhale 1-2 puffs into the lungs every 4 hours as needed      aspirin (ASA) 81 MG chewable tablet 81 mg by Per G Tube route daily       atorvastatin (LIPITOR) 80 MG tablet 80 mg by Enteral route daily      doxazosin (CARDURA) 2 MG tablet 2 mg by Per G Tube route At Bedtime        escitalopram (LEXAPRO) 10 MG tablet 10 mg by Per G Tube route daily       fish oil-omega-3 fatty acids (FISH OIL) 1000 MG capsule 1 g by Per G Tube route 2 times daily       Furosemide (LASIX) 20 MG tablet Take 1 tablet by mouth every morning.  Qty: 90 tablet, Refills: 3    Comments: Keep on file-not needing now  Associated Diagnoses: Unspecified disorder of liver      Heparin Sodium, Porcine, (HEPARIN, PORCINE,) 5000 UNIT/ML SOLN injection Inject 5,000 Units Subcutaneous 2 times daily      Infant Foods (WATER ORAL) LIQD 200 mLs every 4 hours Split between G-tube and J-Tube      magnesium carbonate (MAGONATE) 200 mg/ml liquid 4,000 mg every morning (before breakfast) g-tube      melatonin 3 MG tablet 3 mg by Per G Tube route At Bedtime       metoprolol tartrate (LOPRESSOR) 25 MG tablet 25 mg by Per Feeding Tube route 2 times daily       miconazole (MICATIN) 2 % AERP powder Apply topically 2 times daily       mirtazapine (REMERON) 7.5 MG tablet 22.5 mg by Per G Tube route daily       !! Nutritional Supplements (JEVITY 1.5 KERI PO) 60 mL/hr by Per J Tube route      omeprazole (PRILOSEC) 2 mg/mL suspension 20 mg by Per G Tube route every morning (before breakfast)      !! protein modular (PROSOURCE NO CARB) 1 packet by Per G Tube route daily PRO-STAT per [facility] MAR liquid, dilute with 30-60 ml of water and flush with 30-60 ml after per bottle instructions      !! acetaminophen (TYLENOL) 32 mg/mL liquid 650 mg by Per G Tube route every 12 hours as needed for pain      bisacodyl (DULCOLAX) 10 MG suppository Place 10 mg rectally daily as needed for constipation      ipratropium - albuterol 0.5 mg/2.5 mg/3 mL (DUONEB) 0.5-2.5 (3) MG/3ML neb solution Take 1 vial by nebulization 4 times daily For 3 days      magnesium hydroxide (MILK OF MAGNESIA) 400 MG/5ML suspension 30 mLs by Per G Tube route daily as needed for constipation      magnesium oxide (MAG-OX) 400 MG tablet Take 400 mg by mouth daily      nitroGLYcerin (NITROSTAT)  0.4 MG sublingual tablet For chest pain place 1 tablet under the tongue every 5 minutes for 3 doses. If symptoms persist 5 minutes after 1st dose call 911.    Associated Diagnoses: Ischemic cardiomyopathy      sodium bicarbonate, antacid, POWD powder by Gastric Tube route as needed 650 mg as needed for occluded G-tube, dissolve in 5 ml of water      traZODone (DESYREL) 50 MG tablet 25 mg by Per G Tube route At Bedtime        !! - Potential duplicate medications found. Please discuss with provider.      STOP taking these medications       ciprofloxacin (CIPRO) 250 MG/5ML (5%) suspension Comments:   Reason for Stopping:         oxyCODONE (ROXICODONE) 5 MG tablet Comments:   Reason for Stopping:             Allergies   No Known Allergies    Consultations This Hospital Stay     SOCIAL WORK IP CONSULT  PHARMACY TO DOSE [facility] SERVICES IP CONSULT  NUTRITION SERVICES ADULT IP CONSULT  NUTRITION SERVICES ADULT IP CONSULT  PHARMACY IP CONSULT  PHYSICAL THERAPY ADULT IP CONSULT  OCCUPATIONAL THERAPY ADULT IP CONSULT  SPEECH LANGUAGE PATH ADULT IP CONSULT    Significant Results and Procedures   Procedures        Data   Results for orders placed or performed during the hospital encounter of 07/15/19   XR Chest 2 Views    Narrative    XR CHEST 2 VIEWS   7/15/2019 1:46 AM     HISTORY: Hypoxia and confusion.    COMPARISON: 1/3/2007.    FINDINGS: Sternal wires and mediastinal clips. No pneumothorax. The  heart is enlarged. There is no pulmonary edema. There are bibasilar  infiltrates, left greater than right. The lungs are otherwise clear.  No pleural effusion.      Impression    IMPRESSION: Bibasilar atelectasis or pneumonia.    HADLEY GUZMAN MD   CT Abdomen Pelvis w Contrast    Narrative    CT ABDOMEN/PELVIS WITH CONTRAST   7/15/2019 1:46 AM     HISTORY: Delirium, hypoxia, distended abdomen.    TECHNIQUE:  CT abdomen and pelvis with 93 mL Isovue-370 IV. Radiation  dose for this scan was reduced using automated  exposure control,  adjustment of the mA and/or kV according to patient size, or iterative  reconstruction technique.    COMPARISON: 7/11/2019.    FINDINGS:    Abdomen: There is dependent atelectasis at the lung bases, increased  since the previous exam. Previous median sternotomy. The heart is  mildly enlarged. The liver has a nodular contour suggesting cirrhosis.  There are again a few small low-attenuation splenic lesions. The  gallbladder, pancreas and right adrenal gland are normal in  appearance. There is again a left adrenal gland nodule measuring up to  2.6 cm. The kidneys are within normal limits. There is extensive  atherosclerotic calcification of the aorta and its branches. 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 No aortic  dissection.    Pelvis: There is mild wall thickening of the descending and sigmoid  colon consistent with colitis, similar to the previous exam. The colon  is distended with gas and fluid. The small bowel is also distended  with gas and fluid. No evidence of obstruction. GJ tube in place.  There is a small amount of ascites in the right inguinal canal. No  free intraperitoneal gas. Catheter in the urinary bladder.  Degenerative disease in the spine.      Impression    IMPRESSION:  1. Small and large bowel are distended with gas and fluid. No evidence  of obstruction. These findings are consistent with an ileus.  2. There is again wall thickening of the descending and sigmoid colon  consistent with a nonspecific colitis.  3. Cirrhotic liver.  4. Small amount of fluid in the right inguinal canal.    HADLEY GUZMAN MD   CT Head w/o Contrast    Narrative    CT SCAN OF THE HEAD WITHOUT CONTRAST   7/15/2019 1:19 PM     HISTORY: Altered mental status.    TECHNIQUE:  Axial images of the head and coronal reformations without  IV contrast material.  Radiation dose for this scan was reduced using  automated exposure control, adjustment of the mA and/or kV according  to  patient size, or iterative reconstruction technique.    COMPARISON: 7/4/2019.    FINDINGS: Multiple old infarcts are noted. There are old infarcts in  the cerebellar hemispheres, the right genu of the corpus callosum, the  left corona radiata, the right basal ganglia region and in the right  occipital lobe. Cerebral atrophy and some chronic white matter changes  also noted. Exam is slightly limited by motion artifact. There is no  evidence for any acute infarct, acute hemorrhage, mass effect, or  skull fracture.      Impression    IMPRESSION: Chronic changes. No evidence for intracranial hemorrhage  or any acute process.    KAISER MANZO MD   Abdomen 2 - 3 View*    Narrative    ABDOMEN TWO VIEWS 7/16/2019 11:21 AM     HISTORY: Evaluate ileus.    COMPARISON: 7/15/2019      Impression    IMPRESSION: No evidence of free intraperitoneal air. The colon is  modestly distended with air. Gas extends to the rectum. Minimal small  bowel dilation. Findings suggest ongoing ileus, slightly improved. A  gastrojejunostomy tube is noted.    YULIA LEOS MD   Abdomen 2 - 3 View*    Narrative    ABDOMEN TWO VIEWS  7/17/2019 8:59 AM     HISTORY:  Ileus.    COMPARISON: 7/16/2019      Impression    IMPRESSION: Mild gaseous distention of the colon improved compared to  prior. No evidence of free air or organomegaly. A gastrojejunostomy  tube is noted.    YULIA LEOS MD     Lab Results   Component Value Date    WBC 9.9 07/16/2019    HGB 11.4 (L) 07/17/2019    HCT 31.2 (L) 07/16/2019     07/16/2019    CHOL 283 (H) 06/26/2012    TRIG 154 (H) 06/26/2012    HDL 34 (L) 06/26/2012    ALT 47 07/17/2019    AST 42 07/17/2019     (H) 07/19/2019    BUN 22 07/19/2019    CO2 21 07/19/2019    TSH 0.89 07/16/2019    PSA 0.83 01/26/2010    INR 1.44 (H) 07/15/2019   creat 0.99  Pending Results   Unresulted Labs Ordered in the Past 30 Days of this Admission     Date and Time Order Name Status Description    7/15/2019 0038 Blood culture  Preliminary     7/15/2019 0038 Blood culture Preliminary           Physical Exam   Temp:  [96.2  F (35.7  C)-98.8  F (37.1  C)] 97.8  F (36.6  C)  Pulse:  [67-78] 76  Heart Rate:  [76] 76  Resp:  [16-18] 16  BP: (129-163)/(60-94) 156/82  SpO2:  [94 %-99 %] 96 %  Vitals:    07/16/19 0551 07/18/19 0331 07/19/19 0553   Weight: 85.7 kg (188 lb 15 oz) 86.1 kg (189 lb 13.1 oz) 82.3 kg (181 lb 7 oz)       Constitutional: nad  CV: Regular  Respiratory: CTA bilaterally  GI: Soft, nontender  Skin: Warm and dry        Total time on this discharge was 40 min    Emily Sharma

## 2019-07-19 NOTE — PLAN OF CARE
[place] [facility] DISCHARGE NOTE    Patient discharged to long term care facility at 1615 via stretcher. Accompanied by HE transport staff. [place] [facility] [place], opportunity offered to ask questions. Prescriptions sent with patient. All belongings sent with patient.    Misty Ferreira

## 2019-07-19 NOTE — PLAN OF CARE
Pt alert to self. Assist 2 with lift. Incontinent. Box in place and patent. Pt has feeding tube with feeding running at 60 ml/hr. Pt tolerating. Has infrequent nonproductive cough. Denies pain, nausea, SOB. No stools overnight. Pulsate mattress. LS coarse. Rested comfortably throughout the night. IV antibiotics given. /60 (BP Location: Left arm)   Pulse 72   Temp 97.6  F (36.4  C) (Oral)   Resp 16   Ht 1.829 m (6')   Wt 82.3 kg (181 lb 7 oz)   SpO2 95%   BMI 24.61 kg/m

## 2019-07-19 NOTE — PLAN OF CARE
Call[facility] and [facility] [place] planned for 1530.

## 2019-07-20 NOTE — DISCHARGE INSTRUCTIONS
You will need to have an appointment scheduled with interventional radiology to have your GJ tube replaced.  Please call [phone] to schedule this as soon as possible.

## 2019-07-20 NOTE — ED NOTES
Clog Zapper (enzymes) administered into Gastric tube, letting sit (as instruction states) for 60 minutes prior to any attempts at flushing.

## 2019-07-20 NOTE — ED TRIAGE NOTES
Patient sent by staff at [facility] [place] because JG-tube plugged and not working. Patient with no other complaints.

## 2019-07-20 NOTE — ED NOTES
Talked to Maura at [facility] [place], updated on new order to use J-tube as needed for medications and feedings. Order placed for new JG-tube placement.

## 2019-07-20 NOTE — ED AVS SNAPSHOT
[facility] Emergency Department  [address] [address]  Phone:  [phone]  Fax:  [phone]                                    Sung Osman   MRN: [id number]    Department:  [facility] Emergency Department   Date of Visit:  7/20/2019           After Visit Summary Signature Page    I have received my discharge instructions, and my questions have been answered. I have discussed any challenges I see with this plan with the nurse or doctor.    ..........................................................................................................................................  Patient/Patient Representative Signature      ..........................................................................................................................................  Patient Representative Print Name and Relationship to Patient    ..................................................               ................................................  Date                                   Time    ..........................................................................................................................................  Reviewed by Signature/Title    ...................................................              ..............................................  Date                                               Time          22EPIC Rev 08/18

## 2019-07-20 NOTE — ED NOTES
Attempt at flushing G-tube after giving Clog Zapper, not able to flush. Tried with warm water, again unable to flush. Patient J-tube flushed and patent with easy flow. MD made aware.

## 2019-07-20 NOTE — ED NOTES
Attempts made to aspirate and flush with use of Diet Shena with no success. Rapid continues plugging attempted with no success.

## 2019-07-20 NOTE — ED PROVIDER NOTES
History     Chief Complaint   Patient presents with     Gastric Problem     JG-tube plugged     HPI  Sung Osman is a 74 year old male with an extensive past medical history including alcoholic cirrhosis, kidney disease, previous CVA, and dysphasia dysphagia leading to GJ-tube placement presenting for evaluation of difficulty with his G-tube.  Chart review indicates patient has had recurrent complications with G-tube plugging.  Per report, tube had not been used for more than 12 hours and when they attempted to access it, nothing could be flushed through the tube.  Patient reports otherwise feeling well.  Denies nausea or vomiting.  Denies pain with attempts at flushing.  Does report some pain with manipulation of the tube however.  No fevers or chills.  Otherwise feeling well.      ==================================================================    CHART REVIEW:      [facility] RADIOLOGY  LOCATION: [facility]  DATE: 5/15/2019    PROCEDURE: PERCUTANEOUS GASTROJEJUNOSTOMY PLACEMENT  Following tract dilatation, an 18 Macanese, 45 cm, gastrojejunostomy tube was advanced until the tip was in the proximal jejunum.      END CHART REVIEW  ==================================================================      Allergies:  No Known Allergies    Problem List:    Patient Active Problem List    Diagnosis Date Noted     Other and unspecified alcohol dependence, unspecified drinking behavior 10/11/2006     Priority: High     Off etoh in Jan 2010       Bilateral pneumonia 07/15/2019     Priority: Medium     LFT elevation 07/15/2019     Priority: Medium     Ileus, unspecified (H) 07/15/2019     Priority: Medium     Colitis 07/15/2019     Priority: Medium     Phimosis 07/09/2019     Priority: Medium     Major depressive disorder, single episode, severe (H) 07/04/2019     Priority: Medium     Oropharyngeal dysphagia 07/04/2019     Priority: Medium     Tracheostomy care (H) 07/04/2019     Priority: Medium     Weakness due  to acute cerebrovascular accident (CVA) (H) 07/04/2019     Priority: Medium     Feeding by G-tube (H) 07/04/2019     Priority: Medium     Acute respiratory failure with hypoxia (H) 05/22/2019     Priority: Medium     Alcoholic cirrhosis (H) 05/22/2019     Priority: Medium     Acute encephalopathy 05/19/2019     Priority: Medium     Gastrostomy tube dependent (H) 05/19/2019     Priority: Medium     Muscular wasting and disuse atrophy 05/19/2019     Priority: Medium     Physical deconditioning 05/19/2019     Priority: Medium     Severe protein-calorie malnutrition (H) 05/19/2019     Priority: Medium     History of cerebrovascular accident with residual effects 05/19/2019     Priority: Medium     CAD, multiple vessel 05/02/2019     Priority: Medium     Added automatically from request for surgery [id number]       NSTEMI (non-ST elevated myocardial infarction) (H) 03/13/2019     Priority: Medium     Added automatically from request for surgery [id number]  Added automatically from request for surgery [id number]       CKD (chronic kidney disease) stage 2, GFR 60-89 ml/min 03/13/2019     Priority: Medium     Ischemic cardiomyopathy 03/01/2019     Priority: Medium     3/2019 [facility] NSTEMI.  Echo LVEF 25-30%, +WMA, no clear apical thrombus, norm RV, RAP 3.  3/2019 [facility] NSTEMI.  Echo LVEF 25-30%, +WMA, no clear apical thrombus, norm RV, RAP 3.       Cancer screening 06/26/2012     Priority: Medium     2012 colonoscopy refused  Fit test offered       Hyperlipidemia with target LDL less than 130 01/26/2010     Priority: Medium     But history of critical liver disease---no meds given  Diagnosis updated by automated process. Provider to review and confirm.       Tobacco abuse 01/26/2010     Priority: Medium     Inguinal hernia 10/15/2007     Priority: Medium     Right inguinal  unrepaired  Problem list name updated by automated process. Provider to review       Abnormal glucose 07/24/2007     Priority: Medium     07  Problem list name  updated by automated process. Provider to review       Hypertrophy of prostate with urinary obstruction 05/03/2007     Priority: Medium     Problem list name updated by automated process. Provider to review       Other symptoms involving urinary system 04/04/2007     Priority: Medium     Retention , catheter when at [facility] for liver failure in Jan 07  Problem list name updated by automated process. Provider to review and confirm       Essential hypertension      Priority: Medium     Problem list name updated by automated process. Provider to review       Ascites 03/15/2007     Priority: Medium     Serial paracentesis  Problem list name updated by automated process. Provider to review and confirm  [facility] Update utility       Hyperlipidemia      Priority: Medium     2012  Statin no prescribed due to critical liver ds.  Problem list name updated by automated process. Provider to review          Past Medical History:    Past Medical History:   Diagnosis Date     Intestinal infection due to Clostridium difficile 10/11/2006     Postoperative urinary retention 5/19/2019       Past Surgical History:    No past surgical history on file.    Family History:    Family History   Problem Relation Age of Onset     Heart Disease Father      Alzheimer Disease Brother         dementia       Social History:  Marital Status:   [2]  Social History     Tobacco Use     Smoking status: Current Every Day Smoker     Packs/day: 1.00     Years: 45.00     Pack years: 45.00     Types: Cigarettes     Smokeless tobacco: Never Used   Substance Use Topics     Alcohol use: No     Drug use: No        Medications:      acetaminophen (TYLENOL) 32 mg/mL liquid   acetaminophen (TYLENOL) 32 mg/mL liquid   albuterol (PROVENTIL HFA) 108 (90 Base) MCG/ACT inhaler   amoxicillin-clavulanate (AUGMENTIN) 250-62.5 MG/5ML suspension   aspirin (ASA) 81 MG chewable tablet   atorvastatin (LIPITOR) 80 MG tablet   bisacodyl (DULCOLAX) 10 MG suppository   doxazosin  (CARDURA) 2 MG tablet   escitalopram (LEXAPRO) 10 MG tablet   fish oil-omega-3 fatty acids (FISH OIL) 1000 MG capsule   Furosemide (LASIX) 20 MG tablet   Heparin Sodium, Porcine, (HEPARIN, PORCINE,) 5000 UNIT/ML SOLN injection   Infant Foods (WATER ORAL) LIQD   ipratropium - albuterol 0.5 mg/2.5 mg/3 mL (DUONEB) 0.5-2.5 (3) MG/3ML neb solution   magnesium carbonate (MAGONATE) 200 mg/ml liquid   magnesium hydroxide (MILK OF MAGNESIA) 400 MG/5ML suspension   melatonin 3 MG tablet   metoprolol tartrate (LOPRESSOR) 25 MG tablet   metroNIDAZOLE (FLAGYL) 500 MG tablet   miconazole (MICATIN) 2 % AERP powder   mirtazapine (REMERON) 7.5 MG tablet   nitroGLYcerin (NITROSTAT) 0.4 MG sublingual tablet   Nutritional Supplements (JEVITY 1.5 KERI PO)   omeprazole (PRILOSEC) 2 mg/mL suspension   protein modular (PROSOURCE NO CARB)   sodium bicarbonate, antacid, POWD powder   traZODone (DESYREL) 50 MG tablet         Review of Systems   Constitutional: Negative for chills, fatigue and fever.   Respiratory: Negative for cough and shortness of breath.    Cardiovascular: Negative for chest pain.   Gastrointestinal: Negative for abdominal pain, constipation, diarrhea, nausea and vomiting.   Musculoskeletal: Negative for back pain.   Skin: Negative for rash and wound.   Neurological: Negative for headaches.   All other systems reviewed and are negative.      Physical Exam   BP: 148/88  Pulse: 78  Heart Rate: 77  Temp: 97.6  F (36.4  C)  Resp: 18  SpO2: 97 %      Physical Exam   Constitutional: No distress.   Chronically ill-appearing but not acutely ill   HENT:   Head: Normocephalic and atraumatic.   Cardiovascular: Normal rate.   Pulmonary/Chest: Effort normal.   Abdominal: Soft. Bowel sounds are normal.       Neurological: He is alert.   Minimally verbal but does respond with few words to questions   Skin: Skin is warm and dry.   Psychiatric: He has a normal mood and affect.   Nursing note and vitals reviewed.      ED Course       "  Procedures             Results for orders placed or performed during the hospital encounter of 07/20/19 (from the past 24 hour(s))   Abdomen, flat/upright (2 views)    Narrative    ABDOMEN 2 VIEWS  7/20/2019 4:55 AM     HISTORY: Gastric tube not flushing.    COMPARISON: 7/17/2019.      Impression    IMPRESSION:  1. A percutaneous gastric tube is present with distal tip projecting  over the expected location of the gastric antrum.  2. A second tube projects over the mid abdomen with its distal tip  projecting over the right paravertebral region at the level of the  L2-3 intervertebral disc space. This is nonspecific, but could  represent a long biliary catheter.  3. Gas is scattered within nondilated small and large bowel. No  evidence of bowel obstruction.  4. No visualized bowel wall thickening, pneumatosis or free  intraperitoneal gas.    STEFANY YIN MD       Medications - No data to display     6:03 AM: Still no improvements in dislodging the tube after attempts at flushing with warm water and with a \"zap solution\" from the ICU.\"  Paged on-call surgery to discuss tube replacement.    6:08 AM: Discussed with Dr Herrera, surgery. Recommends replacing with a lópez and close f/u with radiology to formally replace.     6:44 AM: Confirmed with the nurse that the J-tube port is flushing adequately.  It is only the G-tube side that is obstructed.  Overall patient's risk of aspiration of the comp occasions is likely higher to use G only and the only option for replacement at this time would be to place a López into the gastric antrum.  I believe it is safer to continue use of the J-tube and have it formally replaced in the next few days.    Assessments & Plan (with Medical Decision Making)  74-year-old male with history of stroke and associated dysphasia for which she has a GJ tube presenting for evaluation of obstruction of the tube.  NG tube side confirmed to be obstructed and unable to dislodge this with multiple " efforts in the ED.  X-ray confirmed generalized appropriate placement of the tube.  J-tube site of the tube was successfully flushed in the ED.  We do not have access to interventional radiology to replace such a tube over the weekend so recommended utilizing the existing J-tube port site over the weekend until able to schedule a formal replacement with IR early next week.     I have reviewed the nursing notes.    I have reviewed the findings, diagnosis, plan and need for follow up with the patient.         Medication List      There are no discharge medications for this visit.         Final diagnoses:   Obstruction of feeding tube, initial encounter       7/20/2019   [facility] EMERGENCY DEPARTMENT     Robles, Sea Xiao MD  07/20/19 0640

## 2019-07-21 NOTE — TELEPHONE ENCOUNTER
Nursing called tonight at 440pm out of concern for Sung as he has multiple issues occurring.    1.  Only JT is working - has to have an appointment to have this changed.  Staff have sent him in only to come back because they do not have the appropriate staff on the w/e to change out the tube.    2. Pnuemonia - being treated with Amoxicillin.  Now has diarrhea that is stringy and gooey.  Suspect C diff.  Has had 6-8 bed changes already today.      3  Staff worried he is going to be dehydrated.    4.  Abdomen is firm to touch and sore if brushed against while doing his tube feedings.    Orders:  Spent time talking with the nurse.  He has been sent in so many times only to return, do wish to do again but might have to be an option.    Asking nursing to update daughter and present two things.  1.  Send in to the ED for eval for diarrhea and sore abdomen.    2.  If daughter does not want to send in then do a CBC and BMP tomorrow.  Send in sample of stool for cdiff    Did give the nurse this NP's phone number and can call if any issues as this NP started this with her versus call another on call.    Electronically signed by Vesna Pacheco RN, CNP

## 2019-07-21 NOTE — TELEPHONE ENCOUNTER
Resident is receiving Augmentin and having a lot of loose stools.  Staff asking if they can get a sample for C-diff    Ok to send a sample in for C diff    Electronically signed by Vesna Pacheco RN, CNP

## 2019-07-22 NOTE — PROGRESS NOTES
"[facility]  [place] Medical Record Number:  [id number]  Place of Service where encounter took place:  [facility] [[id number]]  Chief Complaint   Patient presents with     RECHECK       HPI:    Sung Osman  is a 74 year old (1945), who is being seen today for an episodic care visit.  HPI information obtained from: facility chart records, facility staff, patient report, [facility] chart review and family/first contact son/Rex report.     Seeing patient today for a follow up. Patient is new to this provider.   Hx reviewed via chart and conversation with son as follows:  - March- patient spent one week in [facility] following an MI  - Returned home, then back to [facility] for a CVA (had a second CVA while in the hospital)  - Second MI after returning home, back to [facility]  - May had a CABG x 4, \"5 small strokes\" following CABG. Spent 3 weeks in [place] for rehab and trach cares. Feeding tube placed.   - [facility]  - Moved to LTC after no improvement after 3 weeks.   - ER visit 7/20 for plugged G tube  - Has had pneumonia x 5 since fall '18, currently on antibiotics    Patient is also having continuous loose stools. Staff called family last noc due to concerns of dehydration anc concentrated urine. Family requested no ER visit. Patient is currently on TF's and Augmentin for pna. On flagyl for possible C-diff per staff.     Per son patient's mentation has drastically declines over the last few months. Today patient is lying in bed and is minimally verbal. He is unable to follow commands. His eyes are open and he attempts to whisper words.     Nursing reports several loose stools per day. Is not taking PO due to dysphagia.         Past Medical and Surgical History reviewed in Epic today.    MEDICATIONS:  Current Outpatient Medications   Medication Sig Dispense Refill     acetaminophen (TYLENOL) 32 mg/mL liquid 650 mg by Per G Tube route 3 times daily "       albuterol (PROVENTIL HFA) 108 (90 Base) MCG/ACT inhaler Inhale 1-2 puffs into the lungs every 4 hours as needed       aspirin (ASA) 81 MG chewable tablet 81 mg by Per G Tube route daily        atorvastatin (LIPITOR) 80 MG tablet 80 mg by Enteral route daily       bisacodyl (DULCOLAX) 10 MG suppository Place 10 mg rectally daily as needed for constipation       doxazosin (CARDURA) 2 MG tablet 2 mg by Per G Tube route At Bedtime        escitalopram (LEXAPRO) 10 MG tablet 10 mg by Per G Tube route daily        fish oil-omega-3 fatty acids (FISH OIL) 1000 MG capsule 1 g by Per G Tube route 2 times daily        furosemide (LASIX) 20 MG tablet 20 mg by Per G Tube route daily       Heparin Sodium, Porcine, (HEPARIN, PORCINE,) 5000 UNIT/ML SOLN injection Inject 5,000 Units Subcutaneous 2 times daily       Infant Foods (WATER ORAL) LIQD 200 mLs every 4 hours Split between G-tube and J-Tube       melatonin 3 MG tablet 3 mg by Per G Tube route At Bedtime        metoprolol tartrate (LOPRESSOR) 25 MG tablet 25 mg by Per Feeding Tube route 2 times daily        miconazole (MICATIN) 2 % AERP powder Apply topically 2 times daily        mirtazapine (REMERON) 7.5 MG tablet 22.5 mg by Per G Tube route daily        nitroGLYcerin (NITROSTAT) 0.4 MG sublingual tablet For chest pain place 1 tablet under the tongue every 5 minutes for 3 doses. If symptoms persist 5 minutes after 1st dose call 911.       Nutritional Supplements (JEVITY 1.5 KERI PO) 60 mL/hr by Per J Tube route       omeprazole (PRILOSEC) 2 mg/mL suspension 20 mg by Per G Tube route every morning (before breakfast)       protein modular (PROSOURCE NO CARB) 1 packet by Per G Tube route daily PRO-STAT per NH MAR liquid, dilute with 30-60 ml of water and flush with 30-60 ml after per bottle instructions       sodium bicarbonate, antacid, POWD powder by Gastric Tube route as needed 650 mg as needed for occluded G-tube, dissolve in 5 ml of water       traZODone (DESYREL) 50  MG tablet 25 mg by Per G Tube route At Bedtime        acetaminophen (TYLENOL) 32 mg/mL liquid 650 mg by Per G Tube route every 12 hours as needed for pain         REVIEW OF SYSTEMS:  Unobtainable secondary to cognitive impairment.     Objective:  /80   Pulse 75   Temp 97.8  F (36.6  C)   Resp 18   Ht 1.829 m (6')   Wt 84.7 kg (186 lb 11.2 oz)   SpO2 95%   BMI 25.32 kg/m    Exam:  GENERAL APPEARANCE:  somnolent, uncooperative  RESP:  shallow breathing, scattered rhonchi, no wheezing  CV:  Palpation and auscultation of heart done , regular rate and rhythm, no murmur, rub, or gallop  ABDOMEN:  soft, no guarding or rebound, bowel sounds normal  SKIN:  pale/ diaphoretic, erythematous skin folds  PSYCH:  flat affect, minimally verbal, does not follow comands    Labs:   CBC RESULTS:   Recent Labs   Lab Test 07/22/19  1233 07/17/19  0616 07/16/19  1256   WBC 10.9  --  9.9   RBC 4.38*  --  3.46*   HGB 11.8* 11.4* 9.3*   HCT 38.8*  --  31.2*   MCV 89  --  90   MCH 26.9  --  26.9   MCHC 30.4*  --  29.8*   RDW 17.9*  --  17.1*     --  265       Last Basic Metabolic Panel:  Recent Labs   Lab Test 07/22/19  1233 07/19/19  0511    145*   POTASSIUM 3.7 3.6   CHLORIDE 109 116*   KERI 8.6 7.9*   CO2 23 21   BUN 29 22   CR 1.04 0.99   * 136*       Liver Function Studies -   Recent Labs   Lab Test 07/17/19  0616 07/16/19  0523   PROTTOTAL 6.3* 6.2*   ALBUMIN 2.0* 2.0*   BILITOTAL 1.1 0.6   ALKPHOS 241* 236*   AST 42 44   ALT 47 58       TSH   Date Value Ref Range Status   07/16/2019 0.89 0.40 - 4.00 mU/L Final   06/26/2012 1.11 0.4 - 5.0 mU/L Final   ]    Lab Results   Component Value Date    A1C 6.4 10/15/2007       ASSESSMENT/PLAN:  History of cerebrovascular accident with residual effects  Acute encephalopathy  - Patient with drastic cognitive decline since march '19 and recurrent hospitalizations  - multiple possible causes (encephalopathy, delirium, CVA, depression), overall minimal improvement with  "extensive physical therapy  - Is non ambulatory, has feeding tube d/t dysphagia, needs assistance with all ADL's.   - D/W patient's son, Rex today. He feels like patient has made minimal progress. He states the family has discussed that if no improvement will likely want a palliative/hospice approach. He will review this again with his sister. For now, will continue current treatment plan. Family is to be called prior to any ER transfers.     CAD, multiple vessel  Ischemic cardiomyopathy  - Vitals stable, no signs of CP or HF  - continue statin, lasix, asa, beta blocker, and subcutaneous heparin  - recent labs reviewed, kidney fxn WNL, will continue lasix- consider stopping if signs of dehydration    Hypertrophy of prostate with urinary obstruction  - lópez placed for significant urine retention  - also phimosis, circumcision d/w family at previous visit. Consider urology eval  - continue for comfort    Alcoholic cirrhosis of liver without ascites (H)  - no ascites. No recent US  - spironolactone stopped 2/2 to hypotension, continue lasix  - consider US and labs      Colitis  Severe protein-calorie malnutrition (H)  G- tube feeding  - CT scan 7/15 with\"small and large bowel are distended with gas and fluid, no small bowel obstruction but findings consistent with ileus. Nonspecific colitis - wall thickening of descending & sigmoid colon.\"  - c-diff negative, will stop flagyl  - after d/w son, will stop Augmentin as this may be contributing to diarrhea  - consider stopping PPI due to risk of contributing to loose stools  - will have dietician eval tube feeding regimen      Major depressive disorder, single episode, severe (H)  - high risk for depression with multiple hospitalizations and decline  - continue lexapro      transcribed by : Hilario Sarmiento  Orders:  1. Dietician to see - dx: loose stools (pt is on feeding tube)  2. Discontinue Flagyl & amoxicillin clavulante  3. Discontinue Magnesium " (MOM)    Total time spent with patient visit at the [facility] nursing facility was 48 including patient visit, review of past records and phone call to patient contact. Greater than 50% of total time spent with counseling and coordinating care due to coordinating care with follow up labs and appointments, counseling patientcare goals and future hospitalizations on: , options of code status and their current code status order, recent past lab and imaging results and subsequent treatment plan and long term goals of care.  Electronically signed by:  SHAUN Barrera CNP

## 2019-07-23 NOTE — LETTER
"    7/23/2019        RE: Sung Osman  [address] [place] [address]        [facility]  [facility] Medical Record Number:  [id number]  Place of Service where encounter took place:  [facility] [[id number]]  Chief Complaint   Patient presents with     RECHECK       HPI:    Sung Osman  is a 74 year old (1945), who is being seen today for an episodic care visit.  HPI information obtained from: facility chart records, facility staff, patient report, [facility] chart review and family/first contact son/Rex report.     Seeing patient today for a follow up. Patient is new to this provider.   Hx reviewed via chart and conversation with son as follows:  - March- patient spent one week in [facility] following an MI  - Returned home, then back to [facility] for a CVA (had a second CVA while in the hospital)  - Second MI after returning home, back to [facility]  - May had a CABG x 4, \"5 small strokes\" following CABG. Spent 3 weeks in [place] for rehab and trach cares. Feeding tube placed.   - July 3 [facility]  - Moved to LTC after no improvement after 3 weeks.   - ER visit 7/20 for plugged G tube  - Has had pneumonia x 5 since fall '18, currently on antibiotics    Patient is also having continuous loose stools. Staff called family last noc due to concerns of dehydration anc concentrated urine. Family requested no ER visit. Patient is currently on TF's and Augmentin for pna. On flagyl for possible C-diff per staff.     Per son patient's mentation has drastically declines over the last few months. Today patient is lying in bed and is minimally verbal. He is unable to follow commands. His eyes are open and he attempts to whisper words.     Nursing reports several loose stools per day. Is not taking PO due to dysphagia.         Past Medical and Surgical History reviewed in Epic today.    MEDICATIONS:  Current Outpatient Medications   Medication Sig " Dispense Refill     acetaminophen (TYLENOL) 32 mg/mL liquid 650 mg by Per G Tube route 3 times daily       albuterol (PROVENTIL HFA) 108 (90 Base) MCG/ACT inhaler Inhale 1-2 puffs into the lungs every 4 hours as needed       aspirin (ASA) 81 MG chewable tablet 81 mg by Per G Tube route daily        atorvastatin (LIPITOR) 80 MG tablet 80 mg by Enteral route daily       bisacodyl (DULCOLAX) 10 MG suppository Place 10 mg rectally daily as needed for constipation       doxazosin (CARDURA) 2 MG tablet 2 mg by Per G Tube route At Bedtime        escitalopram (LEXAPRO) 10 MG tablet 10 mg by Per G Tube route daily        fish oil-omega-3 fatty acids (FISH OIL) 1000 MG capsule 1 g by Per G Tube route 2 times daily        furosemide (LASIX) 20 MG tablet 20 mg by Per G Tube route daily       Heparin Sodium, Porcine, (HEPARIN, PORCINE,) 5000 UNIT/ML SOLN injection Inject 5,000 Units Subcutaneous 2 times daily       Infant Foods (WATER ORAL) LIQD 200 mLs every 4 hours Split between G-tube and J-Tube       melatonin 3 MG tablet 3 mg by Per G Tube route At Bedtime        metoprolol tartrate (LOPRESSOR) 25 MG tablet 25 mg by Per Feeding Tube route 2 times daily        miconazole (MICATIN) 2 % AERP powder Apply topically 2 times daily        mirtazapine (REMERON) 7.5 MG tablet 22.5 mg by Per G Tube route daily        nitroGLYcerin (NITROSTAT) 0.4 MG sublingual tablet For chest pain place 1 tablet under the tongue every 5 minutes for 3 doses. If symptoms persist 5 minutes after 1st dose call 911.       Nutritional Supplements (JEVITY 1.5 KERI PO) 60 mL/hr by Per J Tube route       omeprazole (PRILOSEC) 2 mg/mL suspension 20 mg by Per G Tube route every morning (before breakfast)       protein modular (PROSOURCE NO CARB) 1 packet by Per G Tube route daily PRO-STAT per NH MAR liquid, dilute with 30-60 ml of water and flush with 30-60 ml after per bottle instructions       sodium bicarbonate, antacid, POWD powder by Gastric Tube route as  needed 650 mg as needed for occluded G-tube, dissolve in 5 ml of water       traZODone (DESYREL) 50 MG tablet 25 mg by Per G Tube route At Bedtime        acetaminophen (TYLENOL) 32 mg/mL liquid 650 mg by Per G Tube route every 12 hours as needed for pain         REVIEW OF SYSTEMS:  Unobtainable secondary to cognitive impairment.     Objective:  /80   Pulse 75   Temp 97.8  F (36.6  C)   Resp 18   Ht 1.829 m (6')   Wt 84.7 kg (186 lb 11.2 oz)   SpO2 95%   BMI 25.32 kg/m     Exam:  GENERAL APPEARANCE:  somnolent, uncooperative  RESP:  shallow breathing, scattered rhonchi, no wheezing  CV:  Palpation and auscultation of heart done , regular rate and rhythm, no murmur, rub, or gallop  ABDOMEN:  soft, no guarding or rebound, bowel sounds normal  SKIN:  pale/ diaphoretic, erythematous skin folds  PSYCH:  flat affect, minimally verbal, does not follow comands    Labs:   CBC RESULTS:   Recent Labs   Lab Test 07/22/19  1233 07/17/19  0616 07/16/19  1256   WBC 10.9  --  9.9   RBC 4.38*  --  3.46*   HGB 11.8* 11.4* 9.3*   HCT 38.8*  --  31.2*   MCV 89  --  90   MCH 26.9  --  26.9   MCHC 30.4*  --  29.8*   RDW 17.9*  --  17.1*     --  265       Last Basic Metabolic Panel:  Recent Labs   Lab Test 07/22/19  1233 07/19/19  0511    145*   POTASSIUM 3.7 3.6   CHLORIDE 109 116*   KERI 8.6 7.9*   CO2 23 21   BUN 29 22   CR 1.04 0.99   * 136*       Liver Function Studies -   Recent Labs   Lab Test 07/17/19  0616 07/16/19  0523   PROTTOTAL 6.3* 6.2*   ALBUMIN 2.0* 2.0*   BILITOTAL 1.1 0.6   ALKPHOS 241* 236*   AST 42 44   ALT 47 58       TSH   Date Value Ref Range Status   07/16/2019 0.89 0.40 - 4.00 mU/L Final   06/26/2012 1.11 0.4 - 5.0 mU/L Final   ]    Lab Results   Component Value Date    A1C 6.4 10/15/2007       ASSESSMENT/PLAN:  History of cerebrovascular accident with residual effects  Acute encephalopathy  - Patient with drastic cognitive decline since march '19 and recurrent hospitalizations  -  "multiple possible causes (encephalopathy, delirium, CVA, depression), overall minimal improvement with extensive physical therapy  - Is non ambulatory, has feeding tube d/t dysphagia, needs assistance with all ADL's.   - D/W patient's son, Rex today. He feels like patient has made minimal progress. He states the family has discussed that if no improvement will likely want a palliative/hospice approach. He will review this again with his sister. For now, will continue current treatment plan. Family is to be called prior to any ER transfers.     CAD, multiple vessel  Ischemic cardiomyopathy  - Vitals stable, no signs of CP or HF  - continue statin, lasix, asa, beta blocker, and subcutaneous heparin  - recent labs reviewed, kidney fxn WNL, will continue lasix- consider stopping if signs of dehydration    Hypertrophy of prostate with urinary obstruction  - lópez placed for significant urine retention  - also phimosis, circumcision d/w family at previous visit. Consider urology eval  - continue for comfort    Alcoholic cirrhosis of liver without ascites (H)  - no ascites. No recent US  - spironolactone stopped 2/2 to hypotension, continue lasix  - consider US and labs      Colitis  Severe protein-calorie malnutrition (H)  G- tube feeding  - CT scan 7/15 with\"small and large bowel are distended with gas and fluid, no small bowel obstruction but findings consistent with ileus. Nonspecific colitis - wall thickening of descending & sigmoid colon.\"  - c-diff negative, will stop flagyl  - after d/w son, will stop Augmentin as this may be contributing to diarrhea  - consider stopping PPI due to risk of contributing to loose stools  - will have dietician eval tube feeding regimen      Major depressive disorder, single episode, severe (H)  - high risk for depression with multiple hospitalizations and decline  - continue lexapro      transcribed by : Hilario Sarmiento  Orders:  1. Dietician to see - dx: loose stools " (pt is on feeding tube)  2. Discontinue Flagyl & amoxicillin clavulante  3. Discontinue Magnesium (MOM)    Total time spent with patient visit at the skilled nursing facility was 48 including patient visit, review of past records and phone call to patient contact. Greater than 50% of total time spent with counseling and coordinating care due to coordinating care with follow up labs and appointments, counseling patientcare goals and future hospitalizations on: , options of code status and their current code status order, recent past lab and imaging results and subsequent treatment plan and long term goals of care.  Electronically signed by:  SHAUN Barrera CNP             Sincerely,        SHAUN Barrera CNP

## 2019-07-23 NOTE — ED AVS SNAPSHOT
[facility] Emergency Department  [address] [address]  Phone:  [phone]  Fax:  [phone]                                    Sung Osman   MRN: [id number]    Department:  [facility] Emergency Department   Date of Visit:  7/23/2019           After Visit Summary Signature Page    I have received my discharge instructions, and my questions have been answered. I have discussed any challenges I see with this plan with the nurse or doctor.    ..........................................................................................................................................  Patient/Patient Representative Signature      ..........................................................................................................................................  Patient Representative Print Name and Relationship to Patient    ..................................................               ................................................  Date                                   Time    ..........................................................................................................................................  Reviewed by Signature/Title    ...................................................              ..............................................  Date                                               Time          22EPIC Rev 08/18

## 2019-07-24 NOTE — DISCHARGE INSTRUCTIONS
Arrange for replacement of GJ tube.  This request/order was placed during previous visit.  Continue feedings, and medications as previously performed.    Tubes are now flowing well.  You may hold off on replacement at this point.

## 2019-07-24 NOTE — ED NOTES
Pt  Is here for a clogged JG tube that has been obstructed for 6 hrs. He has not had his evening meds.

## 2019-07-24 NOTE — ED PROVIDER NOTES
History     Chief Complaint   Patient presents with     Feeding Problems      clogged last 6 hrs/ Hx of this     HPI  Sung Osman is a 74 year old male who has complex medical history including history of alcoholic cirrhosis, prior stroke, with GJ tube dependence, presenting to the emergency department via EMS after there were concerns that both the G-tube site, in addition to the J-tube site are clogged, no longer able to pass any sort of medications, or tube feedings through either of the ports.  Patient was seen in the emergency department 4 days ago, on July 20.  Had G-tube clogging at that time.  Patient had otherwise been able to have J-tube feedings, and medications, however G-tube port was clogged.  Therefore, order has been placed for outpatient IR replacement.  However, has not yet had this procedure performed.  Patient requires multiple medications, and since no IV access at the nursing facility, no ability to administer 3G, or J-tube, patient was sent to the emergency department.    Allergies:  No Known Allergies    Problem List:    Patient Active Problem List    Diagnosis Date Noted     Other and unspecified alcohol dependence, unspecified drinking behavior 10/11/2006     Priority: High     Off etoh in Jan 2010       Bilateral pneumonia 07/15/2019     Priority: Medium     LFT elevation 07/15/2019     Priority: Medium     Ileus, unspecified (H) 07/15/2019     Priority: Medium     Colitis 07/15/2019     Priority: Medium     Phimosis 07/09/2019     Priority: Medium     Major depressive disorder, single episode, severe (H) 07/04/2019     Priority: Medium     Oropharyngeal dysphagia 07/04/2019     Priority: Medium     Tracheostomy care (H) 07/04/2019     Priority: Medium     Weakness due to acute cerebrovascular accident (CVA) (H) 07/04/2019     Priority: Medium     Feeding by G-tube (H) 07/04/2019     Priority: Medium     Acute respiratory failure with hypoxia (H) 05/22/2019     Priority: Medium      Alcoholic cirrhosis (H) 05/22/2019     Priority: Medium     Acute encephalopathy 05/19/2019     Priority: Medium     Gastrostomy tube dependent (H) 05/19/2019     Priority: Medium     Muscular wasting and disuse atrophy 05/19/2019     Priority: Medium     Physical deconditioning 05/19/2019     Priority: Medium     Severe protein-calorie malnutrition (H) 05/19/2019     Priority: Medium     History of cerebrovascular accident with residual effects 05/19/2019     Priority: Medium     CAD, multiple vessel 05/02/2019     Priority: Medium     Added automatically from request for surgery [id number]       NSTEMI (non-ST elevated myocardial infarction) (H) 03/13/2019     Priority: Medium     Added automatically from request for surgery [id number]  Added automatically from request for surgery [id number]       CKD (chronic kidney disease) stage 2, GFR 60-89 ml/min 03/13/2019     Priority: Medium     Ischemic cardiomyopathy 03/01/2019     Priority: Medium     3/2019 [facility] NSTEMI.  Echo LVEF 25-30%, +WMA, no clear apical thrombus, norm RV, RAP 3.  3/2019 [facility] NSTEMI.  Echo LVEF 25-30%, +WMA, no clear apical thrombus, norm RV, RAP 3.       Cancer screening 06/26/2012     Priority: Medium     2012 colonoscopy refused  Fit test offered       Hyperlipidemia with target LDL less than 130 01/26/2010     Priority: Medium     But history of critical liver disease---no meds given  Diagnosis updated by automated process. Provider to review and confirm.       Tobacco abuse 01/26/2010     Priority: Medium     Inguinal hernia 10/15/2007     Priority: Medium     Right inguinal  unrepaired  Problem list name updated by automated process. Provider to review       Abnormal glucose 07/24/2007     Priority: Medium     07  Problem list name updated by automated process. Provider to review       Hypertrophy of prostate with urinary obstruction 05/03/2007     Priority: Medium     Problem list name updated by automated process. Provider to review       Other  symptoms involving urinary system 04/04/2007     Priority: Medium     Retention , catheter when at U for liver failure in Jan 07  Problem list name updated by automated process. Provider to review and confirm       Essential hypertension      Priority: Medium     Problem list name updated by automated process. Provider to review       Ascites 03/15/2007     Priority: Medium     Serial paracentesis  Problem list name updated by automated process. Provider to review and confirm  [facility] Update utility       Hyperlipidemia      Priority: Medium     2012  Statin no prescribed due to critical liver ds.  Problem list name updated by automated process. Provider to review          Past Medical History:    Past Medical History:   Diagnosis Date     Intestinal infection due to Clostridium difficile 10/11/2006     Postoperative urinary retention 5/19/2019       Past Surgical History:    No past surgical history on file.    Family History:    Family History   Problem Relation Age of Onset     Heart Disease Father      Alzheimer Disease Brother         dementia       Social History:  Marital Status:   [2]  Social History     Tobacco Use     Smoking status: Current Every Day Smoker     Packs/day: 1.00     Years: 45.00     Pack years: 45.00     Types: Cigarettes     Smokeless tobacco: Never Used   Substance Use Topics     Alcohol use: No     Drug use: No        Medications:      acetaminophen (TYLENOL) 32 mg/mL liquid   acetaminophen (TYLENOL) 32 mg/mL liquid   albuterol (PROVENTIL HFA) 108 (90 Base) MCG/ACT inhaler   amoxicillin-clavulanate (AUGMENTIN) 250-62.5 MG/5ML suspension   aspirin (ASA) 81 MG chewable tablet   atorvastatin (LIPITOR) 80 MG tablet   bisacodyl (DULCOLAX) 10 MG suppository   doxazosin (CARDURA) 2 MG tablet   escitalopram (LEXAPRO) 10 MG tablet   fish oil-omega-3 fatty acids (FISH OIL) 1000 MG capsule   furosemide (LASIX) 20 MG tablet   Furosemide (LASIX) 20 MG tablet   Heparin Sodium, Porcine, (HEPARIN,  PORCINE,) 5000 UNIT/ML SOLN injection   Infant Foods (WATER ORAL) LIQD   ipratropium - albuterol 0.5 mg/2.5 mg/3 mL (DUONEB) 0.5-2.5 (3) MG/3ML neb solution   magnesium carbonate (MAGONATE) 200 mg/ml liquid   magnesium hydroxide (MILK OF MAGNESIA) 400 MG/5ML suspension   melatonin 3 MG tablet   metoprolol tartrate (LOPRESSOR) 25 MG tablet   metroNIDAZOLE (FLAGYL) 500 MG tablet   miconazole (MICATIN) 2 % AERP powder   mirtazapine (REMERON) 7.5 MG tablet   nitroGLYcerin (NITROSTAT) 0.4 MG sublingual tablet   Nutritional Supplements (JEVITY 1.5 KERI PO)   omeprazole (PRILOSEC) 2 mg/mL suspension   protein modular (PROSOURCE NO CARB)   sodium bicarbonate, antacid, POWD powder   traZODone (DESYREL) 50 MG tablet         Review of Systems   Unable to perform ROS: Patient nonverbal       Physical Exam   BP: 120/82  Heart Rate: 94  Temp: 97.9  F (36.6  C)  Resp: 18  Weight: 81.6 kg (180 lb)  SpO2: 97 %      Physical Exam  /82   Temp 97.9  F (36.6  C) (Oral)   Resp 18   Wt 81.6 kg (180 lb)   SpO2 97%   BMI 24.41 kg/m    General: alert and in no acute distress..  Nodding head to questions.  Head: atraumatic, normocephalic  Abd: Soft, nontender, nondistended, no peritoneal signs.  Tube site appears clean, dry, with no surrounding erythema.  No significant tenderness to palpation of the abdomen.  Skin: no rashes, no diaphoresis and skin color normal        ED Course        Procedures               Critical Care time:  none               No results found for this or any previous visit (from the past 24 hour(s)).    Medications - No data to display    Assessments & Plan (with Medical Decision Making)  74 year old male, with extensive past medical history, with history of aphasia secondary to stroke, and G J-tube dependence, presenting to the emergency department from the nursing facility with clogged GJ tubes.  Patient requires medication administration in addition to tube feedings, and therefore since both ports are  clogged, patient was sent to the emergency department.    Nurse was able to flush this with warm water, and subsequently both ports are adequately able to have passage of fluids through both of the ports.  Now, at time of discharge back to the nursing facility, patient has normal flowing GJ tubes.    Patient can continue tube feedings as previously planned, and continue home medications as previously planned.  IR referral was placed for replacement, however patient no longer requires this at this point.  He should follow-up for routine maintenance of his G tube.     I have reviewed the nursing notes.    I have reviewed the findings, diagnosis, plan and need for follow up with the patient.          Medication List      There are no discharge medications for this visit.         Final diagnoses:   Gastrostomy tube obstruction (H)       7/23/2019   [facility] EMERGENCY DEPARTMENT     Dewey Hoover MD  07/24/19 0030

## 2019-07-24 NOTE — ED NOTES
Attempt at flushing J tube and G tube with warm water as well as [place] [employer] with no success. Patient with referral to replace tube from last visit.

## 2019-07-24 NOTE — TELEPHONE ENCOUNTER
See previous notes in Epic re: clogged G tube. Now J portion also clogged. He needs several medications tonight (and tomorrow AM) and has continuous feeds and is completely NPO. Does not have IV access for medications/fluids at SNF. Advised send into ER for eval/meds and set up with IR for replacement of GJ tube. RN will contact Sung's family.    Nguyen Olson, DO

## 2019-07-30 PROBLEM — I50.9 CHF (CONGESTIVE HEART FAILURE) (H): Status: ACTIVE | Noted: 2019-01-01

## 2019-07-30 NOTE — LETTER
7/30/2019        RE: Sung Osman  [address] [place] [address]        [facility]  [facility] Record Number:  [id number]  Place of Service where encounter took place:  [facility] [[id number]]  Chief Complaint   Patient presents with     RECHECK       HPI:    Sung Osman  is a 74 year old (1945), who is being seen today for an episodic care visit.  HPI information obtained from: facility chart records, facility staff, patient report and [facility] chart review.    Seeing patient today for a follow up.   Per staff patient is denying pain and refusing prn tylenol. He does appear to be in pain when repositioning in bed.   Per therapy patient is not improving.     Met with patient in his room. He is sitting in his broda chair. He is non verbal but does nod yes or no. He denies pain. Denies breathing difficulty. Denies abd pain. Reports he is feeling depressed and hopeless. Reports he is motivated to gain strength.     Past Medical and Surgical History reviewed in Epic today.    MEDICATIONS:  Current Outpatient Medications   Medication Sig Dispense Refill     acetaminophen (TYLENOL) 32 mg/mL liquid 650 mg by Per G Tube route every 12 hours as needed for pain       acetaminophen (TYLENOL) 32 mg/mL liquid 650 mg by Per G Tube route 3 times daily       albuterol (PROVENTIL HFA) 108 (90 Base) MCG/ACT inhaler Inhale 1-2 puffs into the lungs every 4 hours as needed       aspirin (ASA) 81 MG chewable tablet 81 mg by Per G Tube route daily        atorvastatin (LIPITOR) 80 MG tablet 80 mg by Enteral route daily       bisacodyl (DULCOLAX) 10 MG suppository Place 10 mg rectally daily as needed for constipation       doxazosin (CARDURA) 2 MG tablet 2 mg by Per G Tube route At Bedtime        escitalopram (LEXAPRO) 10 MG tablet 10 mg by Per G Tube route daily        fish oil-omega-3 fatty acids (FISH OIL) 1000 MG capsule 1 g by Per G Tube route 2 times  daily        furosemide (LASIX) 20 MG tablet 20 mg by Per G Tube route daily       Heparin Sodium, Porcine, (HEPARIN, PORCINE,) 5000 UNIT/ML SOLN injection Inject 5,000 Units Subcutaneous 2 times daily       Infant Foods (WATER ORAL) LIQD 200 mLs every 4 hours Split between G-tube and J-Tube       melatonin 3 MG tablet 3 mg by Per G Tube route At Bedtime        metoprolol tartrate (LOPRESSOR) 25 MG tablet 25 mg by Per Feeding Tube route 2 times daily        miconazole (MICATIN) 2 % AERP powder Apply topically 2 times daily        mirtazapine (REMERON) 7.5 MG tablet 22.5 mg by Per G Tube route daily        nitroGLYcerin (NITROSTAT) 0.4 MG sublingual tablet For chest pain place 1 tablet under the tongue every 5 minutes for 3 doses. If symptoms persist 5 minutes after 1st dose call 911.       Nutritional Supplements (JEVITY 1.5 KERI PO) 60 mL/hr by Per J Tube route       omeprazole (PRILOSEC) 2 mg/mL suspension 20 mg by Per G Tube route every morning (before breakfast)       protein modular (PROSOURCE NO CARB) 1 packet by Per G Tube route daily PRO-STAT per NH MAR liquid, dilute with 30-60 ml of water and flush with 30-60 ml after per bottle instructions       sodium bicarbonate, antacid, POWD powder by Gastric Tube route as needed 650 mg as needed for occluded G-tube, dissolve in 5 ml of water       traZODone (DESYREL) 50 MG tablet 25 mg by Per G Tube route At Bedtime            REVIEW OF SYSTEMS:  4 point ROS including Respiratory, CV, GI and , other than that noted in the HPI,  is negative    Objective:  /73   Pulse 87   Temp 99  F (37.2  C)   Resp 18   Ht 1.829 m (6')   Wt 83.6 kg (184 lb 6.4 oz)   SpO2 93%   BMI 25.01 kg/m     Exam:  GENERAL APPEARANCE:  in no distress, cooperative  RESP:  respiratory effort and palpation of chest normal, lungs clear to auscultation   CV:  Palpation and auscultation of heart done , regular rate and rhythm, no murmur, rub, or gallop  ABDOMEN:  no guarding or rebound,  bowel sounds normal  M/S:   generalized weakness L>R, poor core strength  NEURO:   TAN on command, slow and weak  PSYCH:  appears calm     Labs:   CBC RESULTS:   Recent Labs   Lab Test 07/26/19  1205 07/22/19  1233  07/16/19  1256   WBC  --  10.9  --  9.9   RBC  --  4.38*  --  3.46*   HGB 11.5* 11.8*   < > 9.3*   HCT  --  38.8*  --  31.2*   MCV  --  89  --  90   MCH  --  26.9  --  26.9   MCHC  --  30.4*  --  29.8*   RDW  --  17.9*  --  17.1*   PLT  --  277  --  265    < > = values in this interval not displayed.       Last Basic Metabolic Panel:  Recent Labs   Lab Test 07/26/19  1205 07/22/19  1233    139   POTASSIUM 3.8 3.7   CHLORIDE 104 109   KERI 9.0 8.6   CO2 26 23   BUN 32* 29   CR 0.97 1.04   GLC 88 124*       Liver Function Studies -   Recent Labs   Lab Test 07/17/19  0616 07/16/19  0523   PROTTOTAL 6.3* 6.2*   ALBUMIN 2.0* 2.0*   BILITOTAL 1.1 0.6   ALKPHOS 241* 236*   AST 42 44   ALT 47 58       TSH   Date Value Ref Range Status   07/16/2019 0.89 0.40 - 4.00 mU/L Final   06/26/2012 1.11 0.4 - 5.0 mU/L Final     Lab Results   Component Value Date    A1C 6.4 10/15/2007     ASSESSMENT/PLAN:  History of cerebrovascular accident with residual effects  Feeding by G-tube (H)  Oropharyngeal dysphagia  Weakness due to acute cerebrovascular accident (CVA) (H)  - minimal progress with therapy, In broda chair with limited core strength  - consider hospice alvaro NP has previously d/w patient's son    Ischemic cardiomyopathy  Chronic combined systolic and diastolic congestive heart failure (H)  Hyperlipidemia with target LDL less than 130  - stable  - will check labs, may be able to reduce statin dose  - continue lasix for now, kidney fxn stable    Alcoholic cirrhosis of liver without ascites (H)  - consider f/u labs if patient does not sign onto hospice    Major depressive disorder, single episode, severe (H)  - likely 2/2 to significant physical decline. Will have staff monitor  - continue lexapro and  mirtazapine    Insomnia, unspecified type  - on mirtazapine, trazodone and melatonin  - staff to complete sleep log, consider GDR of sleep meds      transcribed by : Megan Sarmiento  Orders:  1. FYI - pt reports feeling depressed   2. Hospice to eval & treat if on w/ family  3. Discontinue Doxazosin  4. Fasting Lipid panel  - dx: HLD      Electronically signed by:  SHAUN Barrera CNP             Sincerely,        SHAUN Barrera CNP

## 2019-07-30 NOTE — PROGRESS NOTES
[facility]  [facility] Record Number:  [id number]  Place of Service where encounter took place:  [facility] [[id number]]  Chief Complaint   Patient presents with     RECHECK       HPI:    Sung Osman  is a 74 year old (1945), who is being seen today for an episodic care visit.  HPI information obtained from: facility chart records, facility staff, patient report and [facility] chart review.    Seeing patient today for a follow up.   Per staff patient is denying pain and refusing prn tylenol. He does appear to be in pain when repositioning in bed.   Per therapy patient is not improving.     Met with patient in his room. He is sitting in his broda chair. He is non verbal but does nod yes or no. He denies pain. Denies breathing difficulty. Denies abd pain. Reports he is feeling depressed and hopeless. Reports he is motivated to gain strength.     Past Medical and Surgical History reviewed in Epic today.    MEDICATIONS:  Current Outpatient Medications   Medication Sig Dispense Refill     acetaminophen (TYLENOL) 32 mg/mL liquid 650 mg by Per G Tube route every 12 hours as needed for pain       acetaminophen (TYLENOL) 32 mg/mL liquid 650 mg by Per G Tube route 3 times daily       albuterol (PROVENTIL HFA) 108 (90 Base) MCG/ACT inhaler Inhale 1-2 puffs into the lungs every 4 hours as needed       aspirin (ASA) 81 MG chewable tablet 81 mg by Per G Tube route daily        atorvastatin (LIPITOR) 80 MG tablet 80 mg by Enteral route daily       bisacodyl (DULCOLAX) 10 MG suppository Place 10 mg rectally daily as needed for constipation       doxazosin (CARDURA) 2 MG tablet 2 mg by Per G Tube route At Bedtime        escitalopram (LEXAPRO) 10 MG tablet 10 mg by Per G Tube route daily        fish oil-omega-3 fatty acids (FISH OIL) 1000 MG capsule 1 g by Per G Tube route 2 times daily        furosemide (LASIX) 20 MG tablet 20 mg by Per G Tube route daily       Heparin  Sodium, Porcine, (HEPARIN, PORCINE,) 5000 UNIT/ML SOLN injection Inject 5,000 Units Subcutaneous 2 times daily       Infant Foods (WATER ORAL) LIQD 200 mLs every 4 hours Split between G-tube and J-Tube       melatonin 3 MG tablet 3 mg by Per G Tube route At Bedtime        metoprolol tartrate (LOPRESSOR) 25 MG tablet 25 mg by Per Feeding Tube route 2 times daily        miconazole (MICATIN) 2 % AERP powder Apply topically 2 times daily        mirtazapine (REMERON) 7.5 MG tablet 22.5 mg by Per G Tube route daily        nitroGLYcerin (NITROSTAT) 0.4 MG sublingual tablet For chest pain place 1 tablet under the tongue every 5 minutes for 3 doses. If symptoms persist 5 minutes after 1st dose call 911.       Nutritional Supplements (JEVITY 1.5 KERI PO) 60 mL/hr by Per J Tube route       omeprazole (PRILOSEC) 2 mg/mL suspension 20 mg by Per G Tube route every morning (before breakfast)       protein modular (PROSOURCE NO CARB) 1 packet by Per G Tube route daily PRO-STAT per NH MAR liquid, dilute with 30-60 ml of water and flush with 30-60 ml after per bottle instructions       sodium bicarbonate, antacid, POWD powder by Gastric Tube route as needed 650 mg as needed for occluded G-tube, dissolve in 5 ml of water       traZODone (DESYREL) 50 MG tablet 25 mg by Per G Tube route At Bedtime            REVIEW OF SYSTEMS:  4 point ROS including Respiratory, CV, GI and , other than that noted in the HPI,  is negative    Objective:  /73   Pulse 87   Temp 99  F (37.2  C)   Resp 18   Ht 1.829 m (6')   Wt 83.6 kg (184 lb 6.4 oz)   SpO2 93%   BMI 25.01 kg/m    Exam:  GENERAL APPEARANCE:  in no distress, cooperative  RESP:  respiratory effort and palpation of chest normal, lungs clear to auscultation   CV:  Palpation and auscultation of heart done , regular rate and rhythm, no murmur, rub, or gallop  ABDOMEN:  no guarding or rebound, bowel sounds normal  M/S:   generalized weakness L>R, poor core strength  NEURO:   TAN on  command, slow and weak  PSYCH:  appears calm     Labs:   CBC RESULTS:   Recent Labs   Lab Test 07/26/19  1205 07/22/19  1233  07/16/19  1256   WBC  --  10.9  --  9.9   RBC  --  4.38*  --  3.46*   HGB 11.5* 11.8*   < > 9.3*   HCT  --  38.8*  --  31.2*   MCV  --  89  --  90   MCH  --  26.9  --  26.9   MCHC  --  30.4*  --  29.8*   RDW  --  17.9*  --  17.1*   PLT  --  277  --  265    < > = values in this interval not displayed.       Last Basic Metabolic Panel:  Recent Labs   Lab Test 07/26/19  1205 07/22/19  1233    139   POTASSIUM 3.8 3.7   CHLORIDE 104 109   KERI 9.0 8.6   CO2 26 23   BUN 32* 29   CR 0.97 1.04   GLC 88 124*       Liver Function Studies -   Recent Labs   Lab Test 07/17/19  0616 07/16/19  0523   PROTTOTAL 6.3* 6.2*   ALBUMIN 2.0* 2.0*   BILITOTAL 1.1 0.6   ALKPHOS 241* 236*   AST 42 44   ALT 47 58       TSH   Date Value Ref Range Status   07/16/2019 0.89 0.40 - 4.00 mU/L Final   06/26/2012 1.11 0.4 - 5.0 mU/L Final     Lab Results   Component Value Date    A1C 6.4 10/15/2007     ASSESSMENT/PLAN:  History of cerebrovascular accident with residual effects  Feeding by G-tube (H)  Oropharyngeal dysphagia  Weakness due to acute cerebrovascular accident (CVA) (H)  - minimal progress with therapy, In broda chair with limited core strength  - consider hospice alvaro, NP has previously d/w patient's son    Ischemic cardiomyopathy  Chronic combined systolic and diastolic congestive heart failure (H)  Hyperlipidemia with target LDL less than 130  - stable  - will check labs, may be able to reduce statin dose  - continue lasix for now, kidney fxn stable    Alcoholic cirrhosis of liver without ascites (H)  - consider f/u labs if patient does not sign onto hospice    Major depressive disorder, single episode, severe (H)  - likely 2/2 to significant physical decline. Will have staff monitor  - continue lexapro and mirtazapine    Insomnia, unspecified type  - on mirtazapine, trazodone and melatonin  - staff to  complete sleep log, consider GDR of sleep meds      transcribed by : Megan Sarmiento  Orders:  1. FYI - pt reports feeling depressed   2. Hospice to eval & treat if on w/ family  3. Discontinue Doxazosin  4. Fasting Lipid panel  - dx: HLD      Electronically signed by:  SHAUN Barrera CNP

## 2019-08-12 NOTE — PROGRESS NOTES
[facility]  PRIMARY CARE PROVIDER AND CLINIC:  Latonya Duff MD, [facility] [id number] [address] *  Chief Complaint   Patient presents with     Establish Care     [facility] Record Number:  [id number]  Place of Service where encounter took place:  [facility] ([facility] [[id number]]    Sung Osman  is a 74 year old  (1945), admitted to the above facility from  [facility]. Hospital stay 5/22/2019 through 7/3/2019..  Admitted to this facility for  rehab, medical management and nursing care.    HPI:    HPI information obtained from: facility staff, [facility] chart review and family/first contact daughter report.   Brief Summary of Hospital Course:   - Pt with PMH notable for multiple hospitalizations this year for STEMI with EF 25%, pleural effusion/pna in early march, then ischemic CVA in April, later  In May underwent CABG x 4 vs with considerable complications including difficulty to extubate, recurrent multiple strokes, re-intubated, s/p GJ tube, had a prolonged stay at [facility].    Updates on Status Since Skilled nursing Admission:   - sent to ED for clogged GJ tube x 2   - diagnosed with CAUTI treated with cipro.   - readmitted to the hospital from 7/15 until 7/19 for acute respiratory failure 2/2 HCAP/aspiration pneumonia, ileus, electrolytes derangements.  - changed to comfort care and no ER visit, enrolled in hospice.   - Resident seen and examined today, non verbal, spoke, seem comfortable, does not seem to be in pain, and breathing is acceptable. RN reports Resident is declining.   ---------------------------------------------------------------------------------------------------  CODE STATUS/ADVANCE DIRECTIVES DISCUSSION:   DNR / DNI  Patient's living condition: lives alone  ALLERGIES: Patient has no known allergies.  PAST MEDICAL HISTORY:  has a past medical history of Intestinal infection due to Clostridium  difficile (10/11/2006) and Postoperative urinary retention (5/19/2019).  PAST SURGICAL HISTORY:   has no past surgical history on file.  FAMILY HISTORY: family history includes Alzheimer Disease in his brother; Heart Disease in his father.  SOCIAL HISTORY:   reports that he has been smoking cigarettes.  He has a 45.00 pack-year smoking history. He has never used smokeless tobacco. He reports that he does not drink alcohol or use drugs.    Post Discharge Medication Reconciliation Status: discharge medications reconciled and changed, per note/orders (see AVS)  Current Outpatient Medications   Medication Sig Dispense Refill     acetaminophen (TYLENOL) 325 MG suppository Place 650 mg rectally every 6 hours as needed for fever       artificial tears OINT ophthalmic ointment every 2 hours as needed for dry eyes Instill 2 drop in both eyes every 2 hours as needed for Pain, red eyes clean with warm wash cloth       aspirin (ASA) 81 MG chewable tablet 81 mg by Per G Tube route daily        bisacodyl (DULCOLAX) 10 MG suppository Place 10 mg rectally daily as needed for constipation       escitalopram (LEXAPRO) 10 MG tablet 10 mg by Per G Tube route daily        fentaNYL (DURAGESIC) 12 mcg/hr 72 hr patch Place 1 patch onto the skin every 72 hours Apply 12 mcg transdermally every 72 hours for pain on posterior shoulder, alternate sites, cover with Tegaderm and remove per schedule remove old patch.       furosemide (LASIX) 20 MG tablet 20 mg by Per G Tube route daily       ipratropium - albuterol 0.5 mg/2.5 mg/3 mL (DUONEB) 0.5-2.5 (3) MG/3ML neb solution Take 1 vial by nebulization 2 times daily 1 vial inhale orally two times a day for dyspnea/wheezing       LORazepam (ATIVAN) 0.2 mg/mL injection Inject into the vein every 4 hours as needed for anxiety Give 0.5 mg by mouth every 4 hours as needed for Agitation       LORazepam (ATIVAN) 0.5 MG tablet Take 0.5 mg by mouth every 4 hours as needed for anxiety Give 0.5 mg via G-Tube  every 4 hours as needed for Agitation Per hospice Dr. Kasie Romo       Menthol, Topical Analgesic, (BIOFREEZE) 4 % GEL Apply to left ankle topically every 4 hours as needed       metoprolol tartrate (LOPRESSOR) 25 MG tablet 25 mg by Per Feeding Tube route 2 times daily        miconazole (MICATIN) 2 % AERP powder Apply topically 2 times daily        miconazole (MICATIN) 2 % external cream Apply topically 2 times daily APPLY TO PERIAREA TOPICALLY AS NEEDED FOR RASH PER HOSPICE OK TO KEEP AT BEDSIDE       morphine 10 MG/5ML solution Give 0.25 ml by mouth every 4 hours for Pain MAY GIVE PO/SL AND Give 0.25 ml by mouth every 1 hours as needed for pain       nitroGLYcerin (NITROSTAT) 0.4 MG sublingual tablet For chest pain place 1 tablet under the tongue every 5 minutes for 3 doses. If symptoms persist 5 minutes after 1st dose call 911.       Nutritional Supplements (JEVITY 1.5 KERI PO) 60 mL/hr by Per J Tube route Give 200 ml via G-Tube four times a day for feeding ** DO NOT USE PLUNGER-FEEDING WILL TAKE 10-15 MINUTES       omeprazole (PRILOSEC) 2 mg/mL suspension 20 mg by Per G Tube route every morning (before breakfast)       sodium bicarbonate, antacid, POWD powder by Gastric Tube route as needed 650 mg as needed for occluded G-tube, dissolve in 5 ml of water       traZODone (DESYREL) 50 MG tablet 25 mg by Per G Tube route At Bedtime        acetaminophen (TYLENOL) 32 mg/mL liquid 650 mg by Per G Tube route every 12 hours as needed for pain       acetaminophen (TYLENOL) 32 mg/mL liquid 650 mg by Per G Tube route 3 times daily       albuterol (PROVENTIL HFA) 108 (90 Base) MCG/ACT inhaler Inhale 1-2 puffs into the lungs every 4 hours as needed       atorvastatin (LIPITOR) 80 MG tablet 80 mg by Enteral route daily       fish oil-omega-3 fatty acids (FISH OIL) 1000 MG capsule 1 g by Per G Tube route 2 times daily        Heparin Sodium, Porcine, (HEPARIN, PORCINE,) 5000 UNIT/ML SOLN injection Inject 5,000 Units  Subcutaneous 2 times daily       Infant Foods (WATER ORAL) LIQD 200 mLs every 4 hours Split between G-tube and J-Tube       melatonin 3 MG tablet 3 mg by Per G Tube route At Bedtime        mirtazapine (REMERON) 7.5 MG tablet 22.5 mg by Per G Tube route daily        protein modular (PROSOURCE NO CARB) 1 packet by Per G Tube route daily PRO-STAT per NH MAR liquid, dilute with 30-60 ml of water and flush with 30-60 ml after per bottle instructions       ROS:  Unobtainable secondary to cognitive impairment.     Vitals:  /83   Pulse 94   Temp 98.5  F (36.9  C)   Resp 18   Ht 1.829 m (6')   Wt 83.9 kg (185 lb)   SpO2 95%   BMI 25.09 kg/m    Exam:  GENERAL APPEARANCE: lying in bed, comfortably  ENT:  O2 mask on.   EYES:  Closed.   RESP:  lungs clear to auscultation, diffuse coarse sounds.   CV:  S1S2 audible, rapid regular HR, no murmur appreciated.   ABDOMEN:  soft, NT/ND, BS audible. no mass appreciated on palpation.   M/S:   no joint deformity noted on observation.   SKIN:  No rash.   NEURO:   No NFD appreciated on observation.   PSYCH: lethargic.       Lab/Diagnostic data: Reviewed in the chart and EHR.      ASSESSMENT/PLAN:  History of cerebrovascular accident with residual effects  Feeding by G-tube (H)  Oropharyngeal dysphagia  Ischemic cardiomyopathy  Chronic combined systolic and diastolic congestive heart failure (H)  Severe protein-calorie malnutrition (H)  CKD (chronic kidney disease) stage 2, GFR 60-89 ml/min  Hospice care patient  - Pt is actively dying. Spoke to the daughter, will stop medicine. Support provided. Discussed with nursing staff about the plan.        transcribed by : Megan Sarmiento  Orders:  1. Discontinue ASA  2. Discontinue Lexapro  3. Discontinue Jeviti/  4. Discontinue Metoprolol  5. Discontinue Omeprazole  6. Discontinue Trazodone      Electronically signed by:  Lori Nova MD

## 2019-08-12 NOTE — LETTER
8/12/2019        RE: Sung Osman  [address] [place] [id number]        [facility]  PRIMARY CARE PROVIDER AND CLINIC:  Latonya Duff MD, [facility] [address] [address] / [address] *  Chief Complaint   Patient presents with     Establish Care     [facility] Record Number:  [id number]  Place of Service where encounter took place:  [facility] [place], operated by [facility] [[id number]]    Sung Osman  is a 74 year old  (1945), admitted to the above facility from  [facility]. Hospital stay 5/22/2019 through 7/3/2019..  Admitted to this facility for  rehab, medical management and nursing care.    HPI:    HPI information obtained from: facility staff, [facility] chart review and family/first contact daughter report.   Brief Summary of Hospital Course:   - Pt with PMH notable for multiple hospitalizations this year for STEMI with EF 25%, pleural effusion/pna in early march, then ischemic CVA in April, later  In May underwent CABG x 4 vs with considerable complications including difficulty to extubate, recurrent multiple strokes, re-intubated, s/p GJ tube, had a prolonged stay at [facility].    Updates on Status Since Skilled nursing Admission:   - sent to ED for clogged GJ tube x 2   - diagnosed with CAUTI treated with cipro.   - readmitted to the hospital from 7/15 until 7/19 for acute respiratory failure 2/2 HCAP/aspiration pneumonia, ileus, electrolytes derangements.  - changed to comfort care and no ER visit, enrolled in hospice.   - Resident seen and examined today, non verbal, spoke, seem comfortable, does not seem to be in pain, and breathing is acceptable. RN reports Resident is declining.   ---------------------------------------------------------------------------------------------------  CODE STATUS/ADVANCE DIRECTIVES DISCUSSION:   DNR / DNI  Patient's living condition: lives alone  ALLERGIES: Patient has no known allergies.  PAST  MEDICAL HISTORY:  has a past medical history of Intestinal infection due to Clostridium difficile (10/11/2006) and Postoperative urinary retention (5/19/2019).  PAST SURGICAL HISTORY:   has no past surgical history on file.  FAMILY HISTORY: family history includes Alzheimer Disease in his brother; Heart Disease in his father.  SOCIAL HISTORY:   reports that he has been smoking cigarettes.  He has a 45.00 pack-year smoking history. He has never used smokeless tobacco. He reports that he does not drink alcohol or use drugs.    Post Discharge Medication Reconciliation Status: discharge medications reconciled and changed, per note/orders (see AVS)  Current Outpatient Medications   Medication Sig Dispense Refill     acetaminophen (TYLENOL) 325 MG suppository Place 650 mg rectally every 6 hours as needed for fever       artificial tears OINT ophthalmic ointment every 2 hours as needed for dry eyes Instill 2 drop in both eyes every 2 hours as needed for Pain, red eyes clean with warm wash cloth       aspirin (ASA) 81 MG chewable tablet 81 mg by Per G Tube route daily        bisacodyl (DULCOLAX) 10 MG suppository Place 10 mg rectally daily as needed for constipation       escitalopram (LEXAPRO) 10 MG tablet 10 mg by Per G Tube route daily        fentaNYL (DURAGESIC) 12 mcg/hr 72 hr patch Place 1 patch onto the skin every 72 hours Apply 12 mcg transdermally every 72 hours for pain on posterior shoulder, alternate sites, cover with Tegaderm and remove per schedule remove old patch.       furosemide (LASIX) 20 MG tablet 20 mg by Per G Tube route daily       ipratropium - albuterol 0.5 mg/2.5 mg/3 mL (DUONEB) 0.5-2.5 (3) MG/3ML neb solution Take 1 vial by nebulization 2 times daily 1 vial inhale orally two times a day for dyspnea/wheezing       LORazepam (ATIVAN) 0.2 mg/mL injection Inject into the vein every 4 hours as needed for anxiety Give 0.5 mg by mouth every 4 hours as needed for Agitation       LORazepam (ATIVAN) 0.5 MG  tablet Take 0.5 mg by mouth every 4 hours as needed for anxiety Give 0.5 mg via G-Tube every 4 hours as needed for Agitation Per hospice Dr. Kasie Romo       Menthol, Topical Analgesic, (BIOFREEZE) 4 % GEL Apply to left ankle topically every 4 hours as needed       metoprolol tartrate (LOPRESSOR) 25 MG tablet 25 mg by Per Feeding Tube route 2 times daily        miconazole (MICATIN) 2 % AERP powder Apply topically 2 times daily        miconazole (MICATIN) 2 % external cream Apply topically 2 times daily APPLY TO PERIAREA TOPICALLY AS NEEDED FOR RASH PER HOSPICE OK TO KEEP AT BEDSIDE       morphine 10 MG/5ML solution Give 0.25 ml by mouth every 4 hours for Pain MAY GIVE PO/SL AND Give 0.25 ml by mouth every 1 hours as needed for pain       nitroGLYcerin (NITROSTAT) 0.4 MG sublingual tablet For chest pain place 1 tablet under the tongue every 5 minutes for 3 doses. If symptoms persist 5 minutes after 1st dose call 911.       Nutritional Supplements (JEVITY 1.5 KERI PO) 60 mL/hr by Per J Tube route Give 200 ml via G-Tube four times a day for feeding ** DO NOT USE PLUNGER-FEEDING WILL TAKE 10-15 MINUTES       omeprazole (PRILOSEC) 2 mg/mL suspension 20 mg by Per G Tube route every morning (before breakfast)       sodium bicarbonate, antacid, POWD powder by Gastric Tube route as needed 650 mg as needed for occluded G-tube, dissolve in 5 ml of water       traZODone (DESYREL) 50 MG tablet 25 mg by Per G Tube route At Bedtime        acetaminophen (TYLENOL) 32 mg/mL liquid 650 mg by Per G Tube route every 12 hours as needed for pain       acetaminophen (TYLENOL) 32 mg/mL liquid 650 mg by Per G Tube route 3 times daily       albuterol (PROVENTIL HFA) 108 (90 Base) MCG/ACT inhaler Inhale 1-2 puffs into the lungs every 4 hours as needed       atorvastatin (LIPITOR) 80 MG tablet 80 mg by Enteral route daily       fish oil-omega-3 fatty acids (FISH OIL) 1000 MG capsule 1 g by Per G Tube route 2 times daily        Heparin Sodium,  Porcine, (HEPARIN, PORCINE,) 5000 UNIT/ML SOLN injection Inject 5,000 Units Subcutaneous 2 times daily       Infant Foods (WATER ORAL) LIQD 200 mLs every 4 hours Split between G-tube and J-Tube       melatonin 3 MG tablet 3 mg by Per G Tube route At Bedtime        mirtazapine (REMERON) 7.5 MG tablet 22.5 mg by Per G Tube route daily        protein modular (PROSOURCE NO CARB) 1 packet by Per G Tube route daily PRO-STAT per NH MAR liquid, dilute with 30-60 ml of water and flush with 30-60 ml after per bottle instructions       ROS:  Unobtainable secondary to cognitive impairment.     Vitals:  /83   Pulse 94   Temp 98.5  F (36.9  C)   Resp 18   Ht 1.829 m (6')   Wt 83.9 kg (185 lb)   SpO2 95%   BMI 25.09 kg/m     Exam:  GENERAL APPEARANCE: lying in bed, comfortably  ENT:  O2 mask on.   EYES:  Closed.   RESP:  lungs clear to auscultation, diffuse coarse sounds.   CV:  S1S2 audible, rapid regular HR, no murmur appreciated.   ABDOMEN:  soft, NT/ND, BS audible. no mass appreciated on palpation.   M/S:   no joint deformity noted on observation.   SKIN:  No rash.   NEURO:   No NFD appreciated on observation.   PSYCH: lethargic.       Lab/Diagnostic data: Reviewed in the chart and EHR.      ASSESSMENT/PLAN:  History of cerebrovascular accident with residual effects  Feeding by G-tube (H)  Oropharyngeal dysphagia  Ischemic cardiomyopathy  Chronic combined systolic and diastolic congestive heart failure (H)  Severe protein-calorie malnutrition (H)  CKD (chronic kidney disease) stage 2, GFR 60-89 ml/min  Hospice care patient  - Pt is actively dying. Spoke to the daughter, will stop medicine. Support provided. Discussed with nursing staff about the plan.        transcribed by : Megan Sarmiento  Orders:  1. Discontinue ASA  2. Discontinue Lexapro  3. Discontinue Jeviti/  4. Discontinue Metoprolol  5. Discontinue Omeprazole  6. Discontinue Trazodone      Electronically signed by:  Lori Nova MD                  Sincerely,        Lori Nova MD

## 2019-08-23 PROBLEM — I21.4 NSTEMI (NON-ST ELEVATED MYOCARDIAL INFARCTION) (H): Chronic | Status: ACTIVE | Noted: 2019-01-01
